# Patient Record
Sex: MALE | Race: WHITE | URBAN - METROPOLITAN AREA
[De-identification: names, ages, dates, MRNs, and addresses within clinical notes are randomized per-mention and may not be internally consistent; named-entity substitution may affect disease eponyms.]

---

## 2017-10-25 ENCOUNTER — TELEPHONE (OUTPATIENT)
Dept: ENT CLINIC | Age: 67
End: 2017-10-25

## 2017-11-07 ENCOUNTER — OFFICE VISIT (OUTPATIENT)
Dept: VASCULAR SURGERY | Age: 67
End: 2017-11-07

## 2017-11-07 VITALS
DIASTOLIC BLOOD PRESSURE: 40 MMHG | BODY MASS INDEX: 28.2 KG/M2 | HEIGHT: 70 IN | SYSTOLIC BLOOD PRESSURE: 92 MMHG | OXYGEN SATURATION: 96 % | WEIGHT: 197 LBS | HEART RATE: 81 BPM

## 2017-11-07 DIAGNOSIS — I70.223 ATHEROSCLEROSIS OF NATIVE ARTERY OF BOTH LOWER EXTREMITIES WITH REST PAIN (HCC): ICD-10-CM

## 2017-11-07 DIAGNOSIS — I73.9 PAD (PERIPHERAL ARTERY DISEASE) (HCC): Primary | ICD-10-CM

## 2017-11-07 PROCEDURE — 99215 OFFICE O/P EST HI 40 MIN: CPT | Performed by: SURGERY

## 2017-11-07 ASSESSMENT — ENCOUNTER SYMPTOMS
WHEEZING: 0
NAUSEA: 0
DIARRHEA: 0
HEARTBURN: 1
VOMITING: 0
EYES NEGATIVE: 1
ABDOMINAL PAIN: 0
ORTHOPNEA: 0
CONSTIPATION: 0
BLOOD IN STOOL: 0
SPUTUM PRODUCTION: 0

## 2017-11-07 NOTE — PATIENT INSTRUCTIONS
Patient Education        Peripheral Arterial Disease of the Leg: Care Instructions  Your Care Instructions  Peripheral arterial disease (PAD) occurs when the blood vessels (arteries) that supply blood to the legs, belly, pelvis, arms, or neck get too narrow. This reduces blood flow to that area. The legs are affected most often. Fatty buildup (plaque) in the arteries usually is the cause of PAD. This buildup is also called \"hardening\" of the arteries. Your risk of PAD increases if you smoke or have high cholesterol, high blood pressure, diabetes, or a family history of PAD. One of the main symptoms of PAD is intermittent claudication. This is a tight, aching, or squeezing pain in the calf, foot, thigh, or buttock that occurs during exercise. The pain usually gets worse during exercise and goes away when you rest. But as PAD gets worse, you may feel pain even at rest.  Medicines and lifestyle changes may help your symptoms and lower your risk of heart attack and stroke. In some cases, surgery or other treatment is needed. It is important that you follow up with your doctor. Follow-up care is a key part of your treatment and safety. Be sure to make and go to all appointments, and call your doctor if you are having problems. It's also a good idea to know your test results and keep a list of the medicines you take. How can you care for yourself at home? · Do not smoke. Smoking can make PAD worse. If you need help quitting, talk to your doctor about stop-smoking programs and medicines. These can increase your chances of quitting for good. · Take your medicines exactly as prescribed. Call your doctor if you think you are having a problem with your medicine. · If you take a blood thinner, such as aspirin, be sure to get instructions about how to take your medicine safely. Blood thinners can cause serious bleeding problems. · Ask your doctor if a cardiac rehab program is right for you.  Cardiac rehab can help you

## 2017-11-07 NOTE — PROGRESS NOTES
folic acid (FOLVITE) 1 MG tablet Take 1 tablet by mouth daily 10/24/17   Verena Alexander MD   carvedilol (COREG) 6.25 MG tablet Take 1 tablet by mouth 2 times daily (with meals) 10/24/17   Verena Alexander MD   mupirocin (BACTROBAN) 2 % ointment Apply topically daily Apply topically 3 times daily. 10/24/17   Verena Alexander MD   pantoprazole (PROTONIX) 40 MG tablet Take 1 tablet by mouth every morning (before breakfast) 10/24/17   Verena Alexander MD   thiamine 100 MG tablet Take 1 tablet by mouth daily 10/24/17   Verena Alexander MD   spironolactone (ALDACTONE) 50 MG tablet Take 1 tablet by mouth daily 10/25/17   Verena Alexander MD   diclofenac sodium 1 % GEL Apply 2 g topically 2 times daily 10/24/17   Verena Alexander MD   lidocaine (LIDODERM) 5 % Place 1 patch onto the skin every 12 hours 12 hours on, 12 hours off. 10/24/17   Verena Alexander MD   furosemide (LASIX) 20 MG tablet Take 1 tablet by mouth daily 10/24/17   Verena Alexander MD        Allergies:  Pcn [penicillins]     Social History:    Social History     Social History    Marital status: Single     Spouse name: N/A    Number of children: N/A    Years of education: N/A     Occupational History    Not on file. Social History Main Topics    Smoking status: Former Smoker    Smokeless tobacco: Never Used    Alcohol use 7.2 oz/week     12 Cans of beer per week      Comment: daily     Drug use: No    Sexual activity: Not on file     Other Topics Concern    Not on file     Social History Narrative    No narrative on file       Family History:  History reviewed. No pertinent family history. Review of Systems:  Review of Systems   Constitutional: Negative. HENT: Negative. Eyes: Negative. Respiratory: Negative for sputum production and wheezing. Shortness of breath: mild, reports h/o COPD. Denies use of supplemental oxygen at home. Cardiovascular: Positive for claudication.  Negative for chest pain, palpitations, orthopnea, leg swelling and PND. Gastrointestinal: Positive for heartburn. Negative for abdominal pain, blood in stool, constipation, diarrhea, melena, nausea and vomiting. Recent embolization for GI bleed   Genitourinary: Negative. Musculoskeletal: Negative. Skin: Positive for rash (active psoriasis). Negative for itching. Neurological: Negative. Endo/Heme/Allergies: Negative. Psychiatric/Behavioral: Negative. Physical Examination:    Vitals:    11/07/17 1038   BP: (!) 92/40   Site: Right Arm   Position: Sitting   Cuff Size: Medium Adult   Pulse: 81   SpO2: 96%   Weight: 197 lb (89.4 kg)   Height: 5' 10\" (1.778 m)     Body mass index is 28.27 kg/m². Physical Exam   Constitutional: He is oriented to person, place, and time. He appears well-developed and well-nourished. He is active. Non-toxic appearance. He does not appear ill. No distress. HENT:   Head: Normocephalic and atraumatic. Eyes: Conjunctivae and EOM are normal. Pupils are equal, round, and reactive to light. No scleral icterus. Neck: Normal range of motion. Neck supple. Normal carotid pulses and no JVD present. Carotid bruit is not present. No thyroid mass and no thyromegaly present. Cardiovascular: Normal rate, regular rhythm and normal heart sounds. Exam reveals no gallop and no friction rub. No murmur heard. Pulmonary/Chest: Effort normal and breath sounds normal. No accessory muscle usage. No respiratory distress. He has no decreased breath sounds. He has no wheezes. He has no rales. Abdominal: Soft. Bowel sounds are normal. He exhibits no distension, no fluid wave, no abdominal bruit and no mass. There is no tenderness. There is no rebound. Musculoskeletal: Normal range of motion. He exhibits no edema. Lymphadenopathy:     He has no cervical adenopathy. Neurological: He is alert and oriented to person, place, and time. He has normal strength. No cranial nerve deficit or sensory deficit.    Skin: Skin is warm, dry and intact. Rash (positive psoriasis patches on left knee.) noted. Psychiatric: He has a normal mood and affect. His behavior is normal. Judgment and thought content normal.     Extremities:    Vascular exam:   Pulses:    carotid brachial radial femoral popliteal DP PT   RIGHT 2 2  1  0 mono   LEFT 2 2  2  1 0       MEDICAL DECISION MAKING/TESTING  JOSH:  RIGHT 0.28;  LEFT  0.75  Non-invasive vascular studies:  VL Lower Extremity Arteries Right 10/17/2017:  Summary        Severe arterial insufficiency of the right lower extremity with evidence of    inflow disease. Impression:  Severe RLE PAD with rest pain    Plan:  Mr. Jorge Luis Nicole will likely need intervention for his severe right leg PAD. He is having rest pain but no tissue loss. He is walking only a little at rehab. We will check a CTA Aorta with runoff to assess for surgical options. I will see him back in 1-2 weeks with the results of these studies.

## 2017-11-09 ENCOUNTER — HOSPITAL ENCOUNTER (OUTPATIENT)
Dept: CT IMAGING | Age: 67
Discharge: HOME | End: 2017-11-09
Attending: SURGERY | Admitting: SURGERY

## 2017-11-09 DIAGNOSIS — I70.223 ATHEROSCLEROSIS OF NATIVE ARTERY OF BOTH LOWER EXTREMITIES WITH REST PAIN (HCC): ICD-10-CM

## 2017-11-09 DIAGNOSIS — I73.9 PAD (PERIPHERAL ARTERY DISEASE) (HCC): ICD-10-CM

## 2017-11-16 ENCOUNTER — TELEPHONE (OUTPATIENT)
Dept: VASCULAR SURGERY | Age: 67
End: 2017-11-16

## 2017-11-16 ENCOUNTER — OFFICE VISIT (OUTPATIENT)
Dept: VASCULAR SURGERY | Age: 67
End: 2017-11-16

## 2017-11-16 ENCOUNTER — TELEPHONE (OUTPATIENT)
Dept: SURGERY | Age: 67
End: 2017-11-16

## 2017-11-16 VITALS
OXYGEN SATURATION: 99 % | HEART RATE: 95 BPM | HEIGHT: 70 IN | WEIGHT: 198.2 LBS | BODY MASS INDEX: 28.37 KG/M2 | SYSTOLIC BLOOD PRESSURE: 119 MMHG | DIASTOLIC BLOOD PRESSURE: 72 MMHG

## 2017-11-16 DIAGNOSIS — I73.9 PAD (PERIPHERAL ARTERY DISEASE) (HCC): Primary | ICD-10-CM

## 2017-11-16 PROCEDURE — 99214 OFFICE O/P EST MOD 30 MIN: CPT | Performed by: SURGERY

## 2017-11-16 NOTE — PROGRESS NOTES
[address] [place] [address] [facility]  Tiffanie Mancilla, [address] [facility] [address]    Follow-up    Referring Provider:  No primary care provider on file. Patient Name: Lebron Khan  YOB: 1950  Date: 11/16/17    History:  Lebron Khan is a 74 y.o. male being followed for Right leg rest pain. The patient is doing well but complains of continued pain in his right foot at night which resolves with hanging the foot over the side of the bed. He currently does resides at [facility], but he is going to go home this Saturday. He is improving with physical therapy and is able to walk down the hallway. He denies any claudication, but again, is not walking great distances. The patient has had CT angiogram of the lower extremities which reveals a left common iliac stents. There appears to be some impingement on the orifice of the right common iliac artery, which was not previously buttressed with a kissing stent. He also has significant disease throughout the right common and external iliac artery portion as well as right SFA disease. He has three-vessel runoff to the foot with a high takeoff of the anterior tibial artery. In summary, he was recently admitted for massive GI bleeding which stemmed from his taking 5 and 6 naproxen at a time to treat his right foot rest pain. He has had no further symptoms of GI bleeding. Vital Signs  /72 (Site: Right Arm, Position: Sitting, Cuff Size: Medium Adult)   Pulse 95   Ht 5' 10\" (1.778 m)   Wt 198 lb 3.2 oz (89.9 kg)   SpO2 99%   BMI 28.44 kg/m²     Physical Examination:  General:  alert and oriented to person, place and time, well-developed and well-nourished, in no acute distress  Heart: Normal S1 and S2.  Regular rhythm. No murmurs, gallops, or rubs. Lungs:  clear to auscultation without wheezes or rales  Abdomen: soft, nontender, no pulsatile mass palpable. Extremities (musculoskeletal and neurologic):  No cyanosis or clubbing. There is 1+ non-pitting edema of the right foot, likely from keeping it in dependent position. Motor exam is symmetrical 5 out of 5 all extremities bilaterally. Skin/integumentary: Skin color, texture, turgor normal. No rashes or lesions. Vascular exam:   Pulses:    carotid brachial radial femoral popliteal DP PT   RIGHT    1  Faint, mon mono   LEFT    2  1 0     ABIS:  Right 0.28; Left 0.75    Impression:  1. Severe RLE PAD with rest pain. 2.  Recent GI bleed related to taking Naproxyn for rest pain. Plan:  Mr. Shahrzad Campoverde appears to be doing much better today. He is still having some rest pain in the right foot, though it does not seem to be severely affecting his sleep. It does wake him up periodically during the night and he dangles his foot. He currently denies claudication, but is not walking much at rehab. He is going home this weekend. Given that his JOSH is 0.28 and there is a component of rest pain, and particularly in light of his recent severe GI bleed which required gastroduodenal artery embolization and embolization of gastric varices which may have been exacerbated by medications for rest pain, I would recommend intervention. He appears to have 2 main levels of disease, namely the right iliac system as well as the right superficial femoral artery. I think that his rest pain will be resolved simply by stenting the right common and external iliac arteries. He has significant calcified disease of the superficial femoral artery, which would best be served with a bypass. However, again, if his symptoms resolve with the iliac intervention, I doubt that we will need to proceed with the femoral intervention. He is agreeable to this plan and will be scheduled for a right iliac angioplasty and stent in early December.

## 2017-11-16 NOTE — TELEPHONE ENCOUNTER
GALO for pt to CB regarding his surgery date:     [facility] 12/8/2017 CATH LAB  Arrive at 0630, NPO after midnight  Aortoiliac arteriogram with right iliac angioplasty and stent

## 2017-11-16 NOTE — PATIENT INSTRUCTIONS
deodorants, or nail polish. · Take off all jewelry and piercings. And take out contact lenses, if you wear them. At the hospital or surgery center  · Bring a picture ID. · You will be kept comfortable and safe by your anesthesia provider. You may get medicine that relaxes you or puts you in a light sleep. The area being worked on will be numb. · The procedure will take 1½ to 3 hours. · After the procedure, pressure will be applied to the area where the catheter was put in your blood vessel. Then the area may be covered with a bandage or a compression device. This will prevent bleeding. · Nurses will check your heart rate and blood pressure. The nurse will also check the catheter site for bleeding. · You will need to lie still and keep your leg straight for several hours. The nurse may put a weighted bag on your leg to keep it still. · You may have a bruise or a small lump where the catheter was put in your blood vessel. This is normal and will go away. Going home  · Be sure you have someone to drive you home. Anesthesia and pain medicine make it unsafe for you to drive. · You will be given more specific instructions about recovering from your procedure. They will cover things like diet, wound care, follow-up care, driving, and getting back to your normal routine. When should you call your doctor? · You have questions or concerns. · You don't understand how to prepare for your procedure. · You become ill before the procedure (such as fever, flu, or a cold). · You need to reschedule or have changed your mind about having the procedure. Where can you learn more? Go to [email or web address] and sign in to your [employer] account. Enter [phone] in the [facility] box to learn more about \"Peripheral Artery Angioplasty: Before Your Procedure. \"     If you do not have an account, please click on the \"Sign Up Now\" link.   Current as of: March 20, 2017  Content Version: 11.3  © 6377-3806 Healthwise, Incorporated. Care instructions adapted under license by [facility] ([facility]). If you have questions about a medical condition or this instruction, always ask your healthcare professional. [address] any warranty or liability for your use of this information. Patient Education        Peripheral Artery Angioplasty: What to Expect at Home  Your Recovery  Your groin or leg may have a bruise or a small lump where the catheter was put in your groin. The area may feel sore for a day or two after the procedure. You can do light activities around the house but nothing strenuous for several days. After surgery, blood may flow better throughout your leg, which can decrease leg pain, numbness, and cramping. This care sheet gives you a general idea about how long it will take for you to recover. But each person recovers at a different pace. Follow the steps below to feel better as quickly as possible. How can you care for yourself at home? Activity  · Do not do strenuous exercise and do not lift anything heavy until your doctor says it is okay. This may be for a day or two. You can walk around the house and do light activity, such as cooking. · You may shower 24 to 48 hours after the procedure, if your doctor okays it. Pat the incision dry. Do not take a bath for 1 week, or until your doctor tells you it is okay. · Go back to regular exercise when your doctor says it is okay. Walking is a good choice. · If you work, you will probably need to take 1 or 2 days off. It depends on the type of work you do and how you feel. Diet  · You can eat your normal diet. · Drink plenty of fluids (unless your doctor tells you not to). Medicines  · Your doctor will tell you if and when you can restart your medicines. He or she will also give you instructions about taking any new medicines.   · If you take blood thinners, such as warfarin (Coumadin), clopidogrel (Plavix), or aspirin, be sure to

## 2017-11-27 ENCOUNTER — OFFICE VISIT (OUTPATIENT)
Dept: INTERNAL MEDICINE CLINIC | Age: 67
End: 2017-11-27

## 2017-11-27 VITALS
WEIGHT: 199.4 LBS | HEIGHT: 70 IN | HEART RATE: 83 BPM | TEMPERATURE: 97.3 F | OXYGEN SATURATION: 99 % | DIASTOLIC BLOOD PRESSURE: 78 MMHG | BODY MASS INDEX: 28.55 KG/M2 | SYSTOLIC BLOOD PRESSURE: 120 MMHG

## 2017-11-27 DIAGNOSIS — I10 ESSENTIAL HYPERTENSION: ICD-10-CM

## 2017-11-27 DIAGNOSIS — Z01.818 PRE-OP EXAM: Primary | ICD-10-CM

## 2017-11-27 DIAGNOSIS — Z12.11 COLON CANCER SCREENING: ICD-10-CM

## 2017-11-27 DIAGNOSIS — R94.31 ABNORMAL EKG: ICD-10-CM

## 2017-11-27 DIAGNOSIS — Z13.220 SCREENING CHOLESTEROL LEVEL: ICD-10-CM

## 2017-11-27 DIAGNOSIS — J44.9 COPD, MILD (HCC): ICD-10-CM

## 2017-11-27 LAB
ANION GAP SERPL CALCULATED.3IONS-SCNC: 17 MMOL/L (ref 3–16)
BASOPHILS ABSOLUTE: 0.1 K/UL (ref 0–0.2)
BASOPHILS RELATIVE PERCENT: 1.4 %
BUN BLDV-MCNC: 8 MG/DL (ref 7–20)
CALCIUM SERPL-MCNC: 9.9 MG/DL (ref 8.3–10.6)
CHLORIDE BLD-SCNC: 97 MMOL/L (ref 99–110)
CHOLESTEROL, TOTAL: 186 MG/DL (ref 0–199)
CO2: 24 MMOL/L (ref 21–32)
CREAT SERPL-MCNC: 0.7 MG/DL (ref 0.8–1.3)
EOSINOPHILS ABSOLUTE: 0.3 K/UL (ref 0–0.6)
EOSINOPHILS RELATIVE PERCENT: 3.5 %
GFR AFRICAN AMERICAN: >60
GFR NON-AFRICAN AMERICAN: >60
GLUCOSE BLD-MCNC: 95 MG/DL (ref 70–99)
HCT VFR BLD CALC: 37.7 % (ref 40.5–52.5)
HDLC SERPL-MCNC: 45 MG/DL (ref 40–60)
HEMOGLOBIN: 11.6 G/DL (ref 13.5–17.5)
LDL CHOLESTEROL CALCULATED: 118 MG/DL
LYMPHOCYTES ABSOLUTE: 2.6 K/UL (ref 1–5.1)
LYMPHOCYTES RELATIVE PERCENT: 28.3 %
MCH RBC QN AUTO: 26.5 PG (ref 26–34)
MCHC RBC AUTO-ENTMCNC: 30.6 G/DL (ref 31–36)
MCV RBC AUTO: 86.6 FL (ref 80–100)
MONOCYTES ABSOLUTE: 1.1 K/UL (ref 0–1.3)
MONOCYTES RELATIVE PERCENT: 11.7 %
NEUTROPHILS ABSOLUTE: 5.1 K/UL (ref 1.7–7.7)
NEUTROPHILS RELATIVE PERCENT: 55.1 %
PDW BLD-RTO: 19.4 % (ref 12.4–15.4)
PLATELET # BLD: 205 K/UL (ref 135–450)
PMV BLD AUTO: 8.7 FL (ref 5–10.5)
POTASSIUM SERPL-SCNC: 4.3 MMOL/L (ref 3.5–5.1)
RBC # BLD: 4.36 M/UL (ref 4.2–5.9)
SODIUM BLD-SCNC: 138 MMOL/L (ref 136–145)
TRIGL SERPL-MCNC: 114 MG/DL (ref 0–150)
VLDLC SERPL CALC-MCNC: 23 MG/DL
WBC # BLD: 9.2 K/UL (ref 4–11)

## 2017-11-27 PROCEDURE — 1123F ACP DISCUSS/DSCN MKR DOCD: CPT | Performed by: NURSE PRACTITIONER

## 2017-11-27 PROCEDURE — G8599 NO ASA/ANTIPLAT THER USE RNG: HCPCS | Performed by: NURSE PRACTITIONER

## 2017-11-27 PROCEDURE — G8484 FLU IMMUNIZE NO ADMIN: HCPCS | Performed by: NURSE PRACTITIONER

## 2017-11-27 PROCEDURE — 1036F TOBACCO NON-USER: CPT | Performed by: NURSE PRACTITIONER

## 2017-11-27 PROCEDURE — 4040F PNEUMOC VAC/ADMIN/RCVD: CPT | Performed by: NURSE PRACTITIONER

## 2017-11-27 PROCEDURE — 93000 ELECTROCARDIOGRAM COMPLETE: CPT | Performed by: NURSE PRACTITIONER

## 2017-11-27 PROCEDURE — 3023F SPIROM DOC REV: CPT | Performed by: NURSE PRACTITIONER

## 2017-11-27 PROCEDURE — 99203 OFFICE O/P NEW LOW 30 MIN: CPT | Performed by: NURSE PRACTITIONER

## 2017-11-27 PROCEDURE — G8427 DOCREV CUR MEDS BY ELIG CLIN: HCPCS | Performed by: NURSE PRACTITIONER

## 2017-11-27 PROCEDURE — G8926 SPIRO NO PERF OR DOC: HCPCS | Performed by: NURSE PRACTITIONER

## 2017-11-27 PROCEDURE — G8419 CALC BMI OUT NRM PARAM NOF/U: HCPCS | Performed by: NURSE PRACTITIONER

## 2017-11-27 PROCEDURE — 3017F COLORECTAL CA SCREEN DOC REV: CPT | Performed by: NURSE PRACTITIONER

## 2017-11-27 RX ORDER — ALBUTEROL SULFATE 90 UG/1
2 AEROSOL, METERED RESPIRATORY (INHALATION) EVERY 6 HOURS PRN
Qty: 1 INHALER | Refills: 3 | Status: SHIPPED | OUTPATIENT
Start: 2017-11-27 | End: 2020-11-11 | Stop reason: SDUPTHER

## 2017-11-27 ASSESSMENT — PATIENT HEALTH QUESTIONNAIRE - PHQ9
2. FEELING DOWN, DEPRESSED OR HOPELESS: 0
1. LITTLE INTEREST OR PLEASURE IN DOING THINGS: 0
SUM OF ALL RESPONSES TO PHQ9 QUESTIONS 1 & 2: 0
SUM OF ALL RESPONSES TO PHQ QUESTIONS 1-9: 0

## 2017-11-27 NOTE — PROGRESS NOTES
Preoperative Consultation      Elan Knox  YOB: 1950    Date of Service:  11/27/2017    Vitals:    11/27/17 0954   BP: 120/78   Site: Right Arm   Position: Sitting   Pulse: 83   Temp: 97.3 °F (36.3 °C)   TempSrc: Oral   SpO2: 99%   Weight: 199 lb 6.4 oz (90.4 kg)   Height: 5' 10\" (1.778 m)      Wt Readings from Last 2 Encounters:   11/27/17 199 lb 6.4 oz (90.4 kg)   11/16/17 198 lb 3.2 oz (89.9 kg)     BP Readings from Last 3 Encounters:   11/27/17 120/78   11/16/17 119/72   11/07/17 (!) 92/40        Chief Complaint   Patient presents with   Shamarbeth Osborne New Patient    Pre-op Exam     Surgery [facility] 12.8.17 at [facility]      Allergies   Allergen Reactions    Pcn [Penicillins] Hives     Outpatient Prescriptions Marked as Taking for the 11/27/17 encounter (Office Visit) with Maci Chase NP   Medication Sig Dispense Refill    albuterol sulfate HFA (PROAIR HFA) 108 (90 Base) MCG/ACT inhaler Inhale 2 puffs into the lungs every 6 hours as needed for Wheezing 1 Inhaler 3    umeclidinium-vilanterol (ANORO ELLIPTA) 62.5-25 MCG/INH AEPB inhaler Inhale 1 puff into the lungs daily 60 each 0    folic acid (FOLVITE) 1 MG tablet Take 1 tablet by mouth daily 30 tablet 3    carvedilol (COREG) 6.25 MG tablet Take 1 tablet by mouth 2 times daily (with meals) 60 tablet 3    mupirocin (BACTROBAN) 2 % ointment Apply topically daily Apply topically 3 times daily. 1 Tube 2    pantoprazole (PROTONIX) 40 MG tablet Take 1 tablet by mouth every morning (before breakfast) 30 tablet 3    thiamine 100 MG tablet Take 1 tablet by mouth daily 30 tablet 3    spironolactone (ALDACTONE) 50 MG tablet Take 1 tablet by mouth daily 30 tablet 3    diclofenac sodium 1 % GEL Apply 2 g topically 2 times daily 1 Tube 3    lidocaine (LIDODERM) 5 % Place 1 patch onto the skin every 12 hours 12 hours on, 12 hours off.  30 patch 0    furosemide (LASIX) 20 MG tablet Take 1 tablet by mouth daily 30 tablet 3       This patient presents to the office today for a preoperative consultation at the request of surgeon, Dr. Candace Russell, who plans on performing aortoiliac arteriogram with right iliac angioplasty and stent on December 8 at [address] [place] [address].     Planned anesthesia: General   Known anesthesia problems: None   Bleeding risk: Remote history of abnormal bleeding- GI bleed  Personal or FH of DVT/PE: No    Patient objection to receiving blood products: No    Patient Active Problem List   Diagnosis    Bleeding gastric varices    [facility]per[facility] disease) [facility]    COPD, mild ([facility]    Essential hypertension       Past Medical History:   Diagnosis Date    COPD (chronic obstructive pulmonary disease) ([facility]     Hyperlipidemia     Hypertension      Past Surgical History:   Procedure Laterality Date    OTHER SURGICAL HISTORY  10/16/2017    Dr. Jonathon KELLER for gastric varices    UPPER GASTROINTESTINAL ENDOSCOPY  10/17/2017    Dr. Senaida Kussmaul - moderate-large varix in cardia/fundus w/tiny nipple, no bleeding    UPPER GASTROINTESTINAL ENDOSCOPY  10/16/2017    Dr. Senaida Kussmaul - few tiny antral erosions, biopsies done for H pylori, large varices in cardia     Family History   Problem Relation Age of Onset    High Blood Pressure Father      Social History     Social History    Marital status: Single     Spouse name: N/A    Number of children: N/A    Years of education: N/A     Occupational History    Not on file. Social History Main Topics    Smoking status: Former Smoker     Types: Cigarettes    Smokeless tobacco: Never Used    Alcohol use 7.2 oz/week     12 Cans of beer per week      Comment: daily     Drug use: No    Sexual activity: Not on file     Other Topics Concern    Not on file     Social History Narrative    No narrative on file       Review of Systems  A comprehensive review of systems was negative except for what was noted in the HPI.      Physical Exam   Constitutional: He is oriented to person, place,

## 2017-11-28 ENCOUNTER — HOSPITAL ENCOUNTER (OUTPATIENT)
Dept: NON INVASIVE DIAGNOSTICS | Age: 67
Discharge: HOME | End: 2017-11-28
Attending: INTERNAL MEDICINE | Admitting: INTERNAL MEDICINE

## 2017-11-28 ENCOUNTER — HOSPITAL ENCOUNTER (OUTPATIENT)
Dept: VASCULAR LAB | Age: 67
Discharge: HOME | End: 2017-11-28
Attending: SURGERY | Admitting: SURGERY

## 2017-11-28 DIAGNOSIS — I73.9 PERIPHERAL VASCULAR DISEASE (HCC): ICD-10-CM

## 2017-11-28 DIAGNOSIS — R94.31 ABNORMAL ELECTROCARDIOGRAM: ICD-10-CM

## 2017-11-28 DIAGNOSIS — R94.31 ABNORMAL EKG: ICD-10-CM

## 2017-11-28 DIAGNOSIS — I77.1 STENOSIS OF ILIAC ARTERY (HCC): Primary | ICD-10-CM

## 2017-11-28 LAB
LV EF: 46 %
LVEF MODALITY: NORMAL

## 2017-11-29 ENCOUNTER — TELEPHONE (OUTPATIENT)
Dept: INTERNAL MEDICINE CLINIC | Age: 67
End: 2017-11-29

## 2017-11-29 NOTE — TELEPHONE ENCOUNTER
Mary Ellen Cervantes from Dr Alexis Murray office called. Patient is having an aorto-illiac arteriogram on 12.8. 17. Upon reading the pre-op papers she saw that the patient told Ms Evelyn Perrin that he was having general anesthesia and there was a concern over the abnormal EKG, .  Mary Ellen Cervantes wants Ms tadeo to know that patient is having local anesthesia. I informed Ms Evelyn Perrin and she stated she already cleared the patient for the surgery due to normal stress test.    I informed Mary Ellen Cervantes that Hermancharles Kimbrough is cleared for surgery. She can pull anything she needs out of [facility].

## 2017-12-07 ENCOUNTER — TELEPHONE (OUTPATIENT)
Dept: SURGERY | Age: 67
End: 2017-12-07

## 2017-12-07 NOTE — TELEPHONE ENCOUNTER
Ari Latham w/ the cath lab [phone] states patient is scheduled tomorrow 12/8 @ 7:30. Ari Latham states patient is scheduled for general anesthesia and is questioning if that is correct. Per Dr Don Gibson patient should NOT be scheduled for general anesthesia, patient should be scheduled for local anesthesia.

## 2017-12-08 ENCOUNTER — HOSPITAL ENCOUNTER (OUTPATIENT)
Dept: CARDIAC CATH/INVASIVE PROCEDURES | Age: 67
Discharge: HOME | End: 2017-12-08
Attending: SURGERY | Admitting: SURGERY

## 2017-12-08 VITALS — BODY MASS INDEX: 28.2 KG/M2 | HEIGHT: 70 IN | WEIGHT: 197 LBS | TEMPERATURE: 97.8 F

## 2017-12-08 DIAGNOSIS — I73.9 PAD (PERIPHERAL ARTERY DISEASE) (HCC): ICD-10-CM

## 2017-12-08 LAB
INR BLD: 1.13 (ref 0.85–1.15)
PROTHROMBIN TIME: 12.8 SEC (ref 9.6–13)

## 2017-12-08 PROCEDURE — 37222 PR REVASCULARIZATION ILIAC ART ANGIOP EA IPSI VSL: CPT | Performed by: SURGERY

## 2017-12-08 PROCEDURE — 37221 PR REVSC OPN/PRQ ILIAC ART W/STNT PLMT & ANGIOPLSTY: CPT | Performed by: SURGERY

## 2017-12-08 PROCEDURE — 75625 CONTRAST EXAM ABDOMINL AORTA: CPT | Performed by: SURGERY

## 2017-12-08 RX ORDER — ONDANSETRON 2 MG/ML
4 INJECTION INTRAMUSCULAR; INTRAVENOUS EVERY 6 HOURS PRN
Status: DISCONTINUED | OUTPATIENT
Start: 2017-12-08 | End: 2017-12-09 | Stop reason: HOSPADM

## 2017-12-08 RX ORDER — SODIUM CHLORIDE 0.9 % (FLUSH) 0.9 %
10 SYRINGE (ML) INJECTION PRN
Status: DISCONTINUED | OUTPATIENT
Start: 2017-12-08 | End: 2017-12-09 | Stop reason: HOSPADM

## 2017-12-08 RX ORDER — ACETAMINOPHEN 325 MG/1
650 TABLET ORAL EVERY 4 HOURS PRN
Status: DISCONTINUED | OUTPATIENT
Start: 2017-12-08 | End: 2017-12-09 | Stop reason: HOSPADM

## 2017-12-08 RX ORDER — ASPIRIN 81 MG/1
81 TABLET ORAL DAILY
Qty: 30 TABLET | Refills: 3 | Status: SHIPPED | OUTPATIENT
Start: 2017-12-08 | End: 2018-01-05 | Stop reason: SDUPTHER

## 2017-12-08 RX ORDER — SODIUM CHLORIDE 0.9 % (FLUSH) 0.9 %
10 SYRINGE (ML) INJECTION EVERY 12 HOURS SCHEDULED
Status: DISCONTINUED | OUTPATIENT
Start: 2017-12-08 | End: 2017-12-09 | Stop reason: HOSPADM

## 2017-12-14 ENCOUNTER — TELEPHONE (OUTPATIENT)
Dept: ENT CLINIC | Age: 67
End: 2017-12-14

## 2017-12-14 DIAGNOSIS — I73.9 PAD (PERIPHERAL ARTERY DISEASE) (HCC): Primary | ICD-10-CM

## 2017-12-14 NOTE — TELEPHONE ENCOUNTER
Pt called back stating his leg pain is doing much better,  He does have some swelling in his foot. The patient will call [facility] and schedule his Seg Pressure study for next week and call me back to set up a follow up appt with Dr. Elena Lozada.

## 2017-12-14 NOTE — TELEPHONE ENCOUNTER
----- Message from Bethany Rojas MD sent at 12/14/2017  2:32 PM EST -----  Hi Rudolph Clifton,    Can you call Mr. Penelope Rose and see how he is doing after his angio, if symptoms are better? Also, can you make sure he has repeat multi-level segmental pressures prior to his next visit? Thanks!

## 2017-12-19 ENCOUNTER — HOSPITAL ENCOUNTER (OUTPATIENT)
Dept: VASCULAR LAB | Age: 67
Discharge: HOME | End: 2017-12-19
Attending: SURGERY | Admitting: SURGERY

## 2017-12-19 ENCOUNTER — TELEPHONE (OUTPATIENT)
Dept: SURGERY | Age: 67
End: 2017-12-19

## 2017-12-19 ENCOUNTER — OFFICE VISIT (OUTPATIENT)
Dept: VASCULAR SURGERY | Age: 67
End: 2017-12-19

## 2017-12-19 VITALS
DIASTOLIC BLOOD PRESSURE: 89 MMHG | SYSTOLIC BLOOD PRESSURE: 159 MMHG | OXYGEN SATURATION: 96 % | HEIGHT: 70 IN | WEIGHT: 196 LBS | BODY MASS INDEX: 28.06 KG/M2 | HEART RATE: 77 BPM

## 2017-12-19 DIAGNOSIS — I82.4Y1 DVT, LOWER EXTREMITY, PROXIMAL, ACUTE, RIGHT (HCC): Primary | ICD-10-CM

## 2017-12-19 DIAGNOSIS — M79.89 RIGHT LEG SWELLING: ICD-10-CM

## 2017-12-19 DIAGNOSIS — I73.9 PAD (PERIPHERAL ARTERY DISEASE) (HCC): Primary | ICD-10-CM

## 2017-12-19 DIAGNOSIS — I73.9 PERIPHERAL VASCULAR DISEASE (HCC): ICD-10-CM

## 2017-12-19 PROCEDURE — 99213 OFFICE O/P EST LOW 20 MIN: CPT | Performed by: SURGERY

## 2017-12-19 NOTE — PROGRESS NOTES
This may simply be related to the same episode. He has IVC filter in place. However, since his bleeding episodes have resolved, I am going to treat him with Xarelto for 3 months. I have also given him a prescription for stockings.

## 2018-01-05 ENCOUNTER — OFFICE VISIT (OUTPATIENT)
Dept: INTERNAL MEDICINE CLINIC | Age: 68
End: 2018-01-05

## 2018-01-05 VITALS
SYSTOLIC BLOOD PRESSURE: 132 MMHG | BODY MASS INDEX: 28.35 KG/M2 | WEIGHT: 197.6 LBS | DIASTOLIC BLOOD PRESSURE: 78 MMHG | HEART RATE: 82 BPM | OXYGEN SATURATION: 98 %

## 2018-01-05 DIAGNOSIS — I73.9 PAD (PERIPHERAL ARTERY DISEASE) (HCC): Primary | ICD-10-CM

## 2018-01-05 DIAGNOSIS — M25.561 CHRONIC PAIN OF RIGHT KNEE: ICD-10-CM

## 2018-01-05 DIAGNOSIS — I10 ESSENTIAL HYPERTENSION: ICD-10-CM

## 2018-01-05 DIAGNOSIS — Z23 NEED FOR VACCINATION WITH 13-POLYVALENT PNEUMOCOCCAL CONJUGATE VACCINE: ICD-10-CM

## 2018-01-05 DIAGNOSIS — G89.29 CHRONIC PAIN OF RIGHT KNEE: ICD-10-CM

## 2018-01-05 PROCEDURE — 1123F ACP DISCUSS/DSCN MKR DOCD: CPT | Performed by: NURSE PRACTITIONER

## 2018-01-05 PROCEDURE — G8427 DOCREV CUR MEDS BY ELIG CLIN: HCPCS | Performed by: NURSE PRACTITIONER

## 2018-01-05 PROCEDURE — G8419 CALC BMI OUT NRM PARAM NOF/U: HCPCS | Performed by: NURSE PRACTITIONER

## 2018-01-05 PROCEDURE — 90670 PCV13 VACCINE IM: CPT | Performed by: NURSE PRACTITIONER

## 2018-01-05 PROCEDURE — 1036F TOBACCO NON-USER: CPT | Performed by: NURSE PRACTITIONER

## 2018-01-05 PROCEDURE — G0009 ADMIN PNEUMOCOCCAL VACCINE: HCPCS | Performed by: NURSE PRACTITIONER

## 2018-01-05 PROCEDURE — 99213 OFFICE O/P EST LOW 20 MIN: CPT | Performed by: NURSE PRACTITIONER

## 2018-01-05 PROCEDURE — 4040F PNEUMOC VAC/ADMIN/RCVD: CPT | Performed by: NURSE PRACTITIONER

## 2018-01-05 PROCEDURE — G8484 FLU IMMUNIZE NO ADMIN: HCPCS | Performed by: NURSE PRACTITIONER

## 2018-01-05 PROCEDURE — 3017F COLORECTAL CA SCREEN DOC REV: CPT | Performed by: NURSE PRACTITIONER

## 2018-01-05 RX ORDER — FUROSEMIDE 20 MG/1
20 TABLET ORAL DAILY
Qty: 30 TABLET | Refills: 3 | Status: SHIPPED | OUTPATIENT
Start: 2018-01-05 | End: 2018-04-24 | Stop reason: SDUPTHER

## 2018-01-05 RX ORDER — ASPIRIN 81 MG/1
81 TABLET ORAL DAILY
Qty: 30 TABLET | Refills: 3 | Status: SHIPPED | OUTPATIENT
Start: 2018-01-05 | End: 2019-11-19 | Stop reason: ALTCHOICE

## 2018-01-05 RX ORDER — BLOOD PRESSURE TEST KIT
KIT MISCELLANEOUS
Qty: 1 KIT | Refills: 0 | Status: SHIPPED | OUTPATIENT
Start: 2018-01-05 | End: 2019-11-19 | Stop reason: SDUPTHER

## 2018-01-05 NOTE — PROGRESS NOTES
heart sounds. Pulmonary/Chest: Effort normal and breath sounds normal.   Musculoskeletal:        Right knee: He exhibits swelling and effusion. He exhibits no erythema. No tenderness found. Assessment:      1. PAD (peripheral artery disease) ([address]     2. Essential hypertension  Blood Pressure KIT   3. Chronic pain of right knee  Dennis Breen., Miguel Ferrell MD (Sports,Knee)   4. Need for vaccination with 13-polyvalent pneumococcal conjugate vaccine  Pneumococcal conjugate vaccine 13-valent           Plan:      Refill medications  bp elevated. He needs to begin checking bp at home- home kit ordered.  Advised pt to record and bring to next appt  Begin xarelto 20 mg once daily  Refer to ortho   Give prevnar 13  RTC 3 months

## 2018-01-08 ENCOUNTER — TELEPHONE (OUTPATIENT)
Dept: INTERNAL MEDICINE CLINIC | Age: 68
End: 2018-01-08

## 2018-01-09 ENCOUNTER — OFFICE VISIT (OUTPATIENT)
Dept: ORTHOPEDIC SURGERY | Age: 68
End: 2018-01-09

## 2018-01-09 ENCOUNTER — TELEPHONE (OUTPATIENT)
Dept: ORTHOPEDIC SURGERY | Age: 68
End: 2018-01-09

## 2018-01-09 VITALS
BODY MASS INDEX: 28.28 KG/M2 | SYSTOLIC BLOOD PRESSURE: 164 MMHG | HEIGHT: 70 IN | WEIGHT: 197.53 LBS | DIASTOLIC BLOOD PRESSURE: 91 MMHG | HEART RATE: 90 BPM

## 2018-01-09 DIAGNOSIS — M25.561 RIGHT KNEE PAIN, UNSPECIFIED CHRONICITY: Primary | ICD-10-CM

## 2018-01-09 DIAGNOSIS — M17.12 PRIMARY OSTEOARTHRITIS OF LEFT KNEE: ICD-10-CM

## 2018-01-09 DIAGNOSIS — M17.11 PRIMARY OSTEOARTHRITIS OF RIGHT KNEE: ICD-10-CM

## 2018-01-09 DIAGNOSIS — M23.262 DERANGEMENT OF OTHER LATERAL MENISCUS DUE TO OLD TEAR OR INJURY, LEFT KNEE: ICD-10-CM

## 2018-01-09 PROCEDURE — 1036F TOBACCO NON-USER: CPT | Performed by: ORTHOPAEDIC SURGERY

## 2018-01-09 PROCEDURE — 99203 OFFICE O/P NEW LOW 30 MIN: CPT | Performed by: ORTHOPAEDIC SURGERY

## 2018-01-09 PROCEDURE — G8419 CALC BMI OUT NRM PARAM NOF/U: HCPCS | Performed by: ORTHOPAEDIC SURGERY

## 2018-01-09 PROCEDURE — 73564 X-RAY EXAM KNEE 4 OR MORE: CPT | Performed by: ORTHOPAEDIC SURGERY

## 2018-01-09 PROCEDURE — 1123F ACP DISCUSS/DSCN MKR DOCD: CPT | Performed by: ORTHOPAEDIC SURGERY

## 2018-01-09 PROCEDURE — G8484 FLU IMMUNIZE NO ADMIN: HCPCS | Performed by: ORTHOPAEDIC SURGERY

## 2018-01-09 PROCEDURE — G8427 DOCREV CUR MEDS BY ELIG CLIN: HCPCS | Performed by: ORTHOPAEDIC SURGERY

## 2018-01-09 PROCEDURE — 3017F COLORECTAL CA SCREEN DOC REV: CPT | Performed by: ORTHOPAEDIC SURGERY

## 2018-01-09 PROCEDURE — 4040F PNEUMOC VAC/ADMIN/RCVD: CPT | Performed by: ORTHOPAEDIC SURGERY

## 2018-01-09 RX ORDER — SPIRONOLACTONE 50 MG/1
50 TABLET, FILM COATED ORAL DAILY
Qty: 30 TABLET | Refills: 3 | Status: SHIPPED | OUTPATIENT
Start: 2018-01-09 | End: 2018-04-26 | Stop reason: SDUPTHER

## 2018-01-10 ENCOUNTER — TELEPHONE (OUTPATIENT)
Dept: ORTHOPEDIC SURGERY | Age: 68
End: 2018-01-10

## 2018-01-23 ENCOUNTER — OFFICE VISIT (OUTPATIENT)
Dept: ORTHOPEDIC SURGERY | Age: 68
End: 2018-01-23

## 2018-01-23 VITALS — WEIGHT: 197.53 LBS | BODY MASS INDEX: 28.28 KG/M2 | HEIGHT: 70 IN

## 2018-01-23 DIAGNOSIS — M17.11 PRIMARY OSTEOARTHRITIS OF RIGHT KNEE: Primary | Chronic | ICD-10-CM

## 2018-01-23 DIAGNOSIS — M23.231 DERANG OF MEDIAL MENISCUS DUE TO OLD TEAR/INJ, RIGHT KNEE: ICD-10-CM

## 2018-01-23 PROCEDURE — 20610 DRAIN/INJ JOINT/BURSA W/O US: CPT | Performed by: ORTHOPAEDIC SURGERY

## 2018-01-23 NOTE — PATIENT INSTRUCTIONS
Plan explained to patient.       Joint Injections: Care Instructions  Your Care Instructions    Joint injections are shots into a joint, such as the knee. They may be used to put in medicines, such as pain relievers. A corticosteroid, or steroid, shot is used to reduce inflammation in tendons or joints. It is often used to treat problems such as arthritis, tendinitis, and bursitis. Steroids can be injected directly into a painful, inflamed joint. They can also help reduce inflammation of a bursa. A bursa is a sac of fluid. It cushions and lubricates areas where tendons, ligaments, skin, muscles, or bones rub against each other. A steroid shot can sometimes help with short-term pain relief when other treatments haven't worked. If steroid shots help, pain may improve for weeks or months. Follow-up care is a key part of your treatment and safety. Be sure to make and go to all appointments, and call your doctor if you are having problems. It's also a good idea to know your test results and keep a list of the medicines you take. How can you care for yourself at home? · Put ice or a cold pack on the area for 10 to 20 minutes at a time. Put a thin cloth between the ice and your skin. · Ask your doctor if you can take an over-the-counter pain medicine, such as acetaminophen (Tylenol), ibuprofen (Advil, Motrin), or naproxen (Aleve). Be safe with medicines. Read and follow all instructions on the label. · Avoid strenuous activities for several days. In particular, avoid ones that put stress on the area where you got the shot. · If you have dressings over the area, keep them clean and dry. You may remove them when your doctor tells you to. When should you call for help? Call your doctor now or seek immediate medical care if:  ? · You have signs of infection, such as:  ? Increased pain, swelling, warmth, or redness. ? Red streaks leading from the site. ? Pus draining from the site. ? A fever. ? Watch closely for

## 2018-01-23 NOTE — PROGRESS NOTES
MRI scan reveals marrow edema within the lateral femur including the condyle metaphysis for which contusion or stress injuries favored. Lateral meniscus is intact. He has intermediate to high-grade patellofemoral arthritis. He does have a subtle free edge tear the posterior horn of the medial meniscus. His symptoms appear primarily to be anterior. There is negative Fela sign today laterally. He has minimal to no tenderness medially and negative Fela sign. Ligamentous stability is good. Review of systems January 9, 2018 basically unchanged. This point we talked about the options treatment and he understands the risks and benefits of going ahead with viscous supplementation. The patient returns today for their first Euflexxa injection to the right knee for diagnosis of osteoarthritis. . The risks, benefits, and complications of the injections were again discussed in detail with the patient. The risks discussed included but are not limited to infection, skin reactions, hot swollen joints, and anaphylaxis. The patient gave verbal informed consent for the injection. The patient skin was prepped with iodine and sterile 4x4 gauze pad and the right knee joint was injected with 2 ml of Euflexxa intra-articularly under sterile conditions  into the supra-lateral pouch. The patient tolerated the injection reasonably well. The patient was given instructions to ice the right knee and avoid strenuous activities for 24-48 hours. The patient was instructed to call the office immediately if there is increased pain, redness, warmth, fever, or chills. We will see the patient back in [one week] for their second injection.

## 2018-01-25 ENCOUNTER — PATIENT MESSAGE (OUTPATIENT)
Dept: INTERNAL MEDICINE CLINIC | Age: 68
End: 2018-01-25

## 2018-01-26 RX ORDER — CARVEDILOL 6.25 MG/1
6.25 TABLET ORAL 2 TIMES DAILY WITH MEALS
Qty: 60 TABLET | Refills: 3 | Status: SHIPPED | OUTPATIENT
Start: 2018-01-26 | End: 2018-05-25 | Stop reason: SDUPTHER

## 2018-01-30 ENCOUNTER — OFFICE VISIT (OUTPATIENT)
Dept: ORTHOPEDIC SURGERY | Age: 68
End: 2018-01-30

## 2018-01-30 DIAGNOSIS — M17.11 PRIMARY OSTEOARTHRITIS OF RIGHT KNEE: Primary | Chronic | ICD-10-CM

## 2018-01-30 PROCEDURE — 20610 DRAIN/INJ JOINT/BURSA W/O US: CPT | Performed by: ORTHOPAEDIC SURGERY

## 2018-02-06 ENCOUNTER — OFFICE VISIT (OUTPATIENT)
Dept: ORTHOPEDIC SURGERY | Age: 68
End: 2018-02-06

## 2018-02-06 ENCOUNTER — TELEPHONE (OUTPATIENT)
Dept: INTERNAL MEDICINE CLINIC | Age: 68
End: 2018-02-06

## 2018-02-06 DIAGNOSIS — M17.11 PRIMARY OSTEOARTHRITIS OF RIGHT KNEE: Primary | Chronic | ICD-10-CM

## 2018-02-06 DIAGNOSIS — Z11.4 SCREENING FOR HIV (HUMAN IMMUNODEFICIENCY VIRUS): Primary | ICD-10-CM

## 2018-02-06 PROCEDURE — 20610 DRAIN/INJ JOINT/BURSA W/O US: CPT | Performed by: ORTHOPAEDIC SURGERY

## 2018-02-06 NOTE — TELEPHONE ENCOUNTER
I can put the order in. What would be the diagnosis for testing for HIV?   Please Advise,  Thank you

## 2018-02-09 ENCOUNTER — NURSE ONLY (OUTPATIENT)
Dept: INTERNAL MEDICINE CLINIC | Age: 68
End: 2018-02-09

## 2018-02-09 DIAGNOSIS — Z11.4 SCREENING FOR HIV (HUMAN IMMUNODEFICIENCY VIRUS): ICD-10-CM

## 2018-02-09 DIAGNOSIS — Z23 NEED FOR TDAP VACCINATION: Primary | ICD-10-CM

## 2018-02-09 PROCEDURE — 90715 TDAP VACCINE 7 YRS/> IM: CPT | Performed by: NURSE PRACTITIONER

## 2018-02-09 PROCEDURE — 90471 IMMUNIZATION ADMIN: CPT | Performed by: NURSE PRACTITIONER

## 2018-02-12 LAB
HIV AG/AB: NORMAL
HIV ANTIGEN: NORMAL
HIV-1 ANTIBODY: NORMAL
HIV-2 AB: NORMAL

## 2018-03-13 ENCOUNTER — TELEPHONE (OUTPATIENT)
Dept: SURGERY | Age: 68
End: 2018-03-13

## 2018-03-13 NOTE — TELEPHONE ENCOUNTER
Patient states he was to be scheduled for vein mapping Monday 3/19 patient states he had been working with Maria D Harmon in December. I don't see where patient has been scheduled. Please advise. Thank you!     Jazmín Page [phone] (home)

## 2018-03-15 ENCOUNTER — TELEPHONE (OUTPATIENT)
Dept: INTERNAL MEDICINE CLINIC | Age: 68
End: 2018-03-15

## 2018-03-15 NOTE — TELEPHONE ENCOUNTER
LAST SEEN       NEXT APPOINTMENT       LAST FILL  1.5.18   None   2.8.18      Please Advise,  Thank you

## 2018-03-19 ENCOUNTER — HOSPITAL ENCOUNTER (OUTPATIENT)
Dept: VASCULAR LAB | Age: 68
Discharge: HOME | End: 2018-03-19
Attending: SURGERY | Admitting: SURGERY

## 2018-03-19 DIAGNOSIS — I82.4Y1 ACUTE EMBOLISM AND THROMBOSIS OF DEEP VEIN OF RIGHT PROXIMAL LOWER EXTREMITY (HCC): ICD-10-CM

## 2018-03-20 ENCOUNTER — OFFICE VISIT (OUTPATIENT)
Dept: VASCULAR SURGERY | Age: 68
End: 2018-03-20

## 2018-03-20 VITALS
BODY MASS INDEX: 30.32 KG/M2 | SYSTOLIC BLOOD PRESSURE: 136 MMHG | HEART RATE: 74 BPM | DIASTOLIC BLOOD PRESSURE: 77 MMHG | WEIGHT: 211.8 LBS | HEIGHT: 70 IN | TEMPERATURE: 97.6 F

## 2018-03-20 DIAGNOSIS — I73.9 PAD (PERIPHERAL ARTERY DISEASE) (HCC): Primary | ICD-10-CM

## 2018-03-20 PROCEDURE — 99213 OFFICE O/P EST LOW 20 MIN: CPT | Performed by: SURGERY

## 2018-04-24 RX ORDER — FUROSEMIDE 20 MG/1
20 TABLET ORAL DAILY
Qty: 30 TABLET | Refills: 3 | Status: SHIPPED | OUTPATIENT
Start: 2018-04-24 | End: 2018-09-07 | Stop reason: SDUPTHER

## 2018-04-26 RX ORDER — SPIRONOLACTONE 50 MG/1
50 TABLET, FILM COATED ORAL DAILY
Qty: 30 TABLET | Refills: 0 | Status: SHIPPED | OUTPATIENT
Start: 2018-04-26 | End: 2019-04-22

## 2018-05-25 RX ORDER — CARVEDILOL 6.25 MG/1
6.25 TABLET ORAL 2 TIMES DAILY WITH MEALS
Qty: 60 TABLET | Refills: 3 | Status: SHIPPED | OUTPATIENT
Start: 2018-05-25 | End: 2018-10-09 | Stop reason: SDUPTHER

## 2018-09-04 ENCOUNTER — TELEPHONE (OUTPATIENT)
Dept: VASCULAR SURGERY | Age: 68
End: 2018-09-04

## 2018-09-07 RX ORDER — FUROSEMIDE 20 MG/1
20 TABLET ORAL DAILY
Qty: 30 TABLET | Refills: 0 | Status: SHIPPED | OUTPATIENT
Start: 2018-09-07 | End: 2018-10-09 | Stop reason: SDUPTHER

## 2018-09-20 ENCOUNTER — TELEPHONE (OUTPATIENT)
Dept: SURGERY | Age: 68
End: 2018-09-20

## 2018-09-20 ENCOUNTER — OFFICE VISIT (OUTPATIENT)
Dept: VASCULAR SURGERY | Age: 68
End: 2018-09-20

## 2018-09-20 VITALS
SYSTOLIC BLOOD PRESSURE: 163 MMHG | WEIGHT: 215 LBS | TEMPERATURE: 98.8 F | DIASTOLIC BLOOD PRESSURE: 89 MMHG | BODY MASS INDEX: 30.78 KG/M2 | HEIGHT: 70 IN

## 2018-09-20 DIAGNOSIS — I73.9 PAD (PERIPHERAL ARTERY DISEASE) (HCC): Primary | ICD-10-CM

## 2018-09-20 DIAGNOSIS — Z95.828 STATUS POST INSERTION OF ILIAC ARTERY STENT: ICD-10-CM

## 2018-09-20 PROCEDURE — 1123F ACP DISCUSS/DSCN MKR DOCD: CPT | Performed by: SURGERY

## 2018-09-20 PROCEDURE — 99214 OFFICE O/P EST MOD 30 MIN: CPT | Performed by: SURGERY

## 2018-09-20 PROCEDURE — 1036F TOBACCO NON-USER: CPT | Performed by: SURGERY

## 2018-09-20 PROCEDURE — G8417 CALC BMI ABV UP PARAM F/U: HCPCS | Performed by: SURGERY

## 2018-09-20 PROCEDURE — G8427 DOCREV CUR MEDS BY ELIG CLIN: HCPCS | Performed by: SURGERY

## 2018-09-20 PROCEDURE — 4040F PNEUMOC VAC/ADMIN/RCVD: CPT | Performed by: SURGERY

## 2018-09-20 PROCEDURE — 3017F COLORECTAL CA SCREEN DOC REV: CPT | Performed by: SURGERY

## 2018-09-20 PROCEDURE — 1101F PT FALLS ASSESS-DOCD LE1/YR: CPT | Performed by: SURGERY

## 2018-09-20 NOTE — PROGRESS NOTES
[facility]) Vascular Surgery--Maxine Okeefe Ruth, [place], [facility], [address]    Follow-up    Referring Provider:  ANGELITA Rivera CNP     Patient Name: Kinjal Chun  YOB: 1950  Date: 09/20/18    History:  Kinjal Chun is a 79 y.o. male being followed for PAD. He underwent right iliac angioplasty and stent placement on 12/8/2017. He is doing well. He just returned from vacation. He denies any problems walking. He denies any pain in the legs. Denies swelling. He also has a history of right leg DVT which was treated in 2017 with anticoagulation and he has also had IVC filter placement. He returns in follow-up today and was to have a surveillance duplex scan. Unfortunately, despite this being ordered for him and also multiple phone calls left for him to complete this prior to his visit today, he did not have the arterial duplex study performed. Vital Signs  BP (!) 163/89   Temp 98.8 °F (37.1 °C)   Ht 5' 10\" (1.778 m)   Wt 215 lb (97.5 kg)   BMI 30.85 kg/m²     Physical Examination:  General:  alert and oriented to person, place and time, well-developed and well-nourished, in no acute distress  Heart: RRR no m/r/g  Lungs:  CTA B no w/r/r  Abdomen: soft, NT/ND. Extremities: Bilateral lower extremities are warm and well perfused. No swelling. No cyanosis or ulceration noted. Bilateral femoral pulses 2+. Right dorsalis posterior tibial found by Doppler evaluation. Left dorsalis pedis and posterior tibial is 1+ palpable. Skin/integumentary: Skin color, texture, turgor normal. No rashes or lesions. Non-invasive vascular studies:  Pending    Impression:  Stable peripheral artery disease. Status post right iliac angioplasty and stent, he December 2017. Plan:  Reschedule bilateral lower extremity arterial duplex and aortoiliac duplex study. I will call him with the results.

## 2018-09-20 NOTE — TELEPHONE ENCOUNTER
----- Message from Damian Chavarria MD sent at 9/20/2018  1:55 PM EDT -----  Can cancel this appt. He needs to schedule and have duplex done. I will call him with results--does not need to be seen in office. Thanks.

## 2018-10-01 ENCOUNTER — HOSPITAL ENCOUNTER (OUTPATIENT)
Dept: VASCULAR LAB | Age: 68
Discharge: HOME | End: 2018-10-01
Payer: MEDICARE

## 2018-10-01 DIAGNOSIS — I73.9 PAD (PERIPHERAL ARTERY DISEASE) (HCC): ICD-10-CM

## 2018-10-01 PROCEDURE — 93923 UPR/LXTR ART STDY 3+ LVLS: CPT

## 2018-10-03 ENCOUNTER — TELEPHONE (OUTPATIENT)
Dept: VASCULAR SURGERY | Age: 68
End: 2018-10-03

## 2018-10-09 RX ORDER — CARVEDILOL 6.25 MG/1
TABLET ORAL
Qty: 60 TABLET | Refills: 0 | Status: SHIPPED | OUTPATIENT
Start: 2018-10-09 | End: 2018-10-23 | Stop reason: SDUPTHER

## 2018-10-09 RX ORDER — FUROSEMIDE 20 MG/1
TABLET ORAL
Qty: 30 TABLET | Refills: 0 | Status: SHIPPED | OUTPATIENT
Start: 2018-10-09 | End: 2018-10-23 | Stop reason: SDUPTHER

## 2018-10-23 ENCOUNTER — OFFICE VISIT (OUTPATIENT)
Dept: PRIMARY CARE CLINIC | Age: 68
End: 2018-10-23
Payer: MEDICARE

## 2018-10-23 VITALS
TEMPERATURE: 98.4 F | BODY MASS INDEX: 31.4 KG/M2 | DIASTOLIC BLOOD PRESSURE: 82 MMHG | HEART RATE: 76 BPM | WEIGHT: 212 LBS | HEIGHT: 69 IN | SYSTOLIC BLOOD PRESSURE: 138 MMHG

## 2018-10-23 DIAGNOSIS — Z00.00 MEDICARE ANNUAL WELLNESS VISIT, INITIAL: ICD-10-CM

## 2018-10-23 DIAGNOSIS — Z23 FLU VACCINE NEED: ICD-10-CM

## 2018-10-23 DIAGNOSIS — Z00.00 ROUTINE GENERAL MEDICAL EXAMINATION AT A HEALTH CARE FACILITY: ICD-10-CM

## 2018-10-23 DIAGNOSIS — Z23 NEED FOR PROPHYLACTIC VACCINATION AND INOCULATION AGAINST VARICELLA: ICD-10-CM

## 2018-10-23 DIAGNOSIS — Z12.11 COLON CANCER SCREENING: ICD-10-CM

## 2018-10-23 DIAGNOSIS — Z23 NEED FOR HEPATITIS B VACCINATION: ICD-10-CM

## 2018-10-23 DIAGNOSIS — Z01.00 ENCOUNTER FOR VISION SCREENING: ICD-10-CM

## 2018-10-23 PROCEDURE — G8482 FLU IMMUNIZE ORDER/ADMIN: HCPCS | Performed by: NURSE PRACTITIONER

## 2018-10-23 PROCEDURE — 90662 IIV NO PRSV INCREASED AG IM: CPT | Performed by: NURSE PRACTITIONER

## 2018-10-23 PROCEDURE — G0008 ADMIN INFLUENZA VIRUS VAC: HCPCS | Performed by: NURSE PRACTITIONER

## 2018-10-23 PROCEDURE — 4040F PNEUMOC VAC/ADMIN/RCVD: CPT | Performed by: NURSE PRACTITIONER

## 2018-10-23 PROCEDURE — 90746 HEPB VACCINE 3 DOSE ADULT IM: CPT | Performed by: NURSE PRACTITIONER

## 2018-10-23 PROCEDURE — G0438 PPPS, INITIAL VISIT: HCPCS | Performed by: NURSE PRACTITIONER

## 2018-10-23 PROCEDURE — G0010 ADMIN HEPATITIS B VACCINE: HCPCS | Performed by: NURSE PRACTITIONER

## 2018-10-23 RX ORDER — CARVEDILOL 6.25 MG/1
TABLET ORAL
Qty: 60 TABLET | Refills: 7 | Status: SHIPPED | OUTPATIENT
Start: 2018-10-23 | End: 2019-06-27 | Stop reason: SDUPTHER

## 2018-10-23 RX ORDER — FUROSEMIDE 20 MG/1
TABLET ORAL
Qty: 30 TABLET | Refills: 7 | Status: SHIPPED | OUTPATIENT
Start: 2018-10-23 | End: 2019-06-27 | Stop reason: SDUPTHER

## 2018-10-23 RX ORDER — CHLORAL HYDRATE 500 MG
3000 CAPSULE ORAL DAILY
COMMUNITY

## 2018-10-23 RX ORDER — ASPIRIN 325 MG
325 TABLET ORAL DAILY
COMMUNITY
End: 2019-11-19 | Stop reason: SDUPTHER

## 2018-10-23 ASSESSMENT — PATIENT HEALTH QUESTIONNAIRE - PHQ9
SUM OF ALL RESPONSES TO PHQ QUESTIONS 1-9: 0
SUM OF ALL RESPONSES TO PHQ QUESTIONS 1-9: 0

## 2018-10-23 ASSESSMENT — ANXIETY QUESTIONNAIRES: GAD7 TOTAL SCORE: 0

## 2018-10-23 ASSESSMENT — LIFESTYLE VARIABLES: HOW OFTEN DO YOU HAVE A DRINK CONTAINING ALCOHOL: 0

## 2018-10-23 NOTE — PROGRESS NOTES
Olena Car MD   umeclidinium-vilanterol (ANORO ELLIPTA) 62.5-25 MCG/INH AEPB inhaler Inhale 1 puff into the lungs daily  Beatriz Sanchez MD   apixaban (ELIQUIS) 5 MG TABS tablet Take 1 tablet by mouth 2 times daily  ANGELITA Brown CNP   aspirin 81 MG EC tablet Take 1 tablet by mouth daily  ANGELITA Brown CNP   Blood Pressure KIT Check once daily  ANGELITA Brown CNP   albuterol sulfate HFA (PROAIR HFA) 108 (90 Base) MCG/ACT inhaler Inhale 2 puffs into the lungs every 6 hours as needed for Wheezing  ANGELITA Brown CNP   mupirocin (BACTROBAN) 2 % ointment Apply topically daily Apply topically 3 times daily. Colonel Kimmie MD   diclofenac sodium 1 % GEL Apply 2 g topically 2 times daily  Colonel Kimmie MD   lidocaine (LIDODERM) 5 % Place 1 patch onto the skin every 12 hours 12 hours on, 12 hours off.   Colonel Kimmie MD       Past Medical History:   Diagnosis Date    COPD (chronic obstructive pulmonary disease) ([facility] [address]     History of blood transfusion     Hx of blood clots     Hyperlipidemia     Hypertension     Primary osteoarthritis of right knee 1/9/2018     Past Surgical History:   Procedure Laterality Date    OTHER SURGICAL HISTORY  10/16/2017    Dr. Alma KELLER for gastric varices    UPPER GASTROINTESTINAL ENDOSCOPY  10/17/2017    Dr. Ramirez Null - moderate-large varix in cardia/fundus w/tiny nipple, no bleeding    UPPER GASTROINTESTINAL ENDOSCOPY  10/16/2017    Dr. Ramirez Null - few tiny antral erosions, biopsies done for H pylori, large varices in cardia       Family History   Problem Relation Age of Onset    High Blood Pressure Father        CareTeam (Including outside providers/suppliers regularly involved in providing care):   Patient Care Team:  ANGELITA Brown CNP as PCP - General (Family Nurse Practitioner)  ANGELITA Brown CNP as PCP - [facility] Attributed Provider    Wt Readings from Last 3 Encounters:   10/23/18 212 lb (96.2 kg)   09/20/18 215 lb (97.5 kg)   03/20/18 211 lb 12.8 oz (96.1 kg)     Vitals:    10/23/18 0919   BP: 138/82   Cuff Size: Medium Adult   Pulse: 76   Temp: 98.4 °F (36.9 °C)   TempSrc: Oral   Weight: 212 lb (96.2 kg)   Height: 5' 9\" (1.753 m)     Body mass index is 31.31 kg/m². Patient's complete Health Risk Assessment and screening values have been reviewed and are found in Flowsheets. The following problems were reviewed today and where indicated follow up appointments were made and/or referrals ordered. Positive Risk Factor Screenings with Interventions:     General Health:  General  In general, how would you say your health is?: Good  In the past 7 days, have you experienced any of the following?: (!) New or Increased Pain, None of These  Do you get the social and emotional support that you need?: Yes  Do you have a Living Will?: (!) No  General Health Risk Interventions:  · No Living Will: provided the state-specific advance directive document to the patient    Health Habits/Nutrition:  Health Habits/Nutrition  Do you exercise for at least 20 minutes 2-3 times per week?: Yes  Have you lost any weight without trying in the past 3 months?: No  Do you eat fewer than 2 meals per day?: No  Have you seen a dentist within the past year?: Yes  Body mass index is 31.31 kg/m².   Health Habits/Nutrition Interventions:  Hearing/Vision Interventions:  · Vision concerns:  patient encouraged to make appointment with his/her eye specialist, ophthalmology/optometry referral provided    Safety:  Safety  Do you have working smoke detectors?: Yes  Have all throw rugs been removed or fastened?: Yes  Do you have non-slip mats in all bathtubs?: (!) No  Do all of your stairways have a railing or banister?: Yes  Are your doorways, halls and stairs free of clutter?: Yes  Do you always fasten your seatbelt when you are in a car?: Yes  Safety Interventions:  · Home safety tips provided    Personalized Preventive Plan   Current Health

## 2018-11-05 ENCOUNTER — TELEPHONE (OUTPATIENT)
Dept: GASTROENTEROLOGY | Age: 68
End: 2018-11-05

## 2018-11-05 DIAGNOSIS — Z12.11 ENCOUNTER FOR SCREENING COLONOSCOPY: Primary | ICD-10-CM

## 2018-11-05 RX ORDER — POLYETHYLENE GLYCOL 3350 17 G/17G
POWDER, FOR SOLUTION ORAL
Qty: 255 G | Refills: 0 | Status: SHIPPED | OUTPATIENT
Start: 2018-11-05 | End: 2019-11-19 | Stop reason: SDUPTHER

## 2018-11-19 ENCOUNTER — NURSE ONLY (OUTPATIENT)
Dept: PRIMARY CARE CLINIC | Age: 68
End: 2018-11-19
Payer: MEDICARE

## 2018-11-19 DIAGNOSIS — Z23 NEED FOR HEPATITIS B VACCINATION: Primary | ICD-10-CM

## 2018-11-19 PROCEDURE — 90746 HEPB VACCINE 3 DOSE ADULT IM: CPT | Performed by: NURSE PRACTITIONER

## 2018-11-19 PROCEDURE — G0010 ADMIN HEPATITIS B VACCINE: HCPCS | Performed by: NURSE PRACTITIONER

## 2018-11-21 ENCOUNTER — TELEPHONE (OUTPATIENT)
Dept: GASTROENTEROLOGY | Age: 68
End: 2018-11-21

## 2019-04-22 ENCOUNTER — OFFICE VISIT (OUTPATIENT)
Dept: PRIMARY CARE CLINIC | Age: 69
End: 2019-04-22
Payer: MEDICARE

## 2019-04-22 VITALS
HEIGHT: 69 IN | OXYGEN SATURATION: 97 % | DIASTOLIC BLOOD PRESSURE: 78 MMHG | SYSTOLIC BLOOD PRESSURE: 150 MMHG | WEIGHT: 212 LBS | BODY MASS INDEX: 31.4 KG/M2 | HEART RATE: 78 BPM

## 2019-04-22 DIAGNOSIS — I73.9 PAD (PERIPHERAL ARTERY DISEASE) (HCC): ICD-10-CM

## 2019-04-22 DIAGNOSIS — Z23 NEED FOR PROPHYLACTIC VACCINATION AGAINST STREPTOCOCCUS PNEUMONIAE (PNEUMOCOCCUS): ICD-10-CM

## 2019-04-22 DIAGNOSIS — Z23 NEED FOR PROPHYLACTIC VACCINATION AND INOCULATION AGAINST VARICELLA: ICD-10-CM

## 2019-04-22 DIAGNOSIS — L98.9 SKIN ABNORMALITIES: ICD-10-CM

## 2019-04-22 DIAGNOSIS — J44.9 COPD, MILD (HCC): ICD-10-CM

## 2019-04-22 DIAGNOSIS — I10 ESSENTIAL HYPERTENSION: Primary | ICD-10-CM

## 2019-04-22 DIAGNOSIS — Z12.11 SCREENING FOR COLON CANCER: ICD-10-CM

## 2019-04-22 PROCEDURE — G0009 ADMIN PNEUMOCOCCAL VACCINE: HCPCS | Performed by: NURSE PRACTITIONER

## 2019-04-22 PROCEDURE — 99213 OFFICE O/P EST LOW 20 MIN: CPT | Performed by: NURSE PRACTITIONER

## 2019-04-22 PROCEDURE — G8417 CALC BMI ABV UP PARAM F/U: HCPCS | Performed by: NURSE PRACTITIONER

## 2019-04-22 PROCEDURE — G8427 DOCREV CUR MEDS BY ELIG CLIN: HCPCS | Performed by: NURSE PRACTITIONER

## 2019-04-22 PROCEDURE — G0010 ADMIN HEPATITIS B VACCINE: HCPCS | Performed by: NURSE PRACTITIONER

## 2019-04-22 PROCEDURE — 1036F TOBACCO NON-USER: CPT | Performed by: NURSE PRACTITIONER

## 2019-04-22 PROCEDURE — 1123F ACP DISCUSS/DSCN MKR DOCD: CPT | Performed by: NURSE PRACTITIONER

## 2019-04-22 PROCEDURE — 90732 PPSV23 VACC 2 YRS+ SUBQ/IM: CPT | Performed by: NURSE PRACTITIONER

## 2019-04-22 PROCEDURE — 3023F SPIROM DOC REV: CPT | Performed by: NURSE PRACTITIONER

## 2019-04-22 PROCEDURE — 90746 HEPB VACCINE 3 DOSE ADULT IM: CPT | Performed by: NURSE PRACTITIONER

## 2019-04-22 PROCEDURE — 3017F COLORECTAL CA SCREEN DOC REV: CPT | Performed by: NURSE PRACTITIONER

## 2019-04-22 PROCEDURE — 4040F PNEUMOC VAC/ADMIN/RCVD: CPT | Performed by: NURSE PRACTITIONER

## 2019-04-22 PROCEDURE — G8926 SPIRO NO PERF OR DOC: HCPCS | Performed by: NURSE PRACTITIONER

## 2019-04-22 RX ORDER — LISINOPRIL 10 MG/1
10 TABLET ORAL DAILY
Qty: 30 TABLET | Refills: 3 | Status: SHIPPED | OUTPATIENT
Start: 2019-04-22 | End: 2019-07-17 | Stop reason: SDUPTHER

## 2019-04-29 DIAGNOSIS — I10 ESSENTIAL HYPERTENSION: ICD-10-CM

## 2019-04-29 LAB
ANION GAP SERPL CALCULATED.3IONS-SCNC: 12 MMOL/L (ref 3–16)
BUN BLDV-MCNC: 9 MG/DL (ref 7–20)
CALCIUM SERPL-MCNC: 10 MG/DL (ref 8.3–10.6)
CHLORIDE BLD-SCNC: 101 MMOL/L (ref 99–110)
CHOLESTEROL, FASTING: 172 MG/DL (ref 0–199)
CO2: 26 MMOL/L (ref 21–32)
CREAT SERPL-MCNC: 0.9 MG/DL (ref 0.8–1.3)
GFR AFRICAN AMERICAN: >60
GFR NON-AFRICAN AMERICAN: >60
GLUCOSE BLD-MCNC: 129 MG/DL (ref 70–99)
HDLC SERPL-MCNC: 44 MG/DL (ref 40–60)
LDL CHOLESTEROL CALCULATED: 103 MG/DL
POTASSIUM SERPL-SCNC: 5 MMOL/L (ref 3.5–5.1)
SODIUM BLD-SCNC: 139 MMOL/L (ref 136–145)
TRIGLYCERIDE, FASTING: 123 MG/DL (ref 0–150)
VLDLC SERPL CALC-MCNC: 25 MG/DL

## 2019-06-27 RX ORDER — FUROSEMIDE 20 MG/1
TABLET ORAL
Qty: 30 TABLET | Refills: 3 | Status: SHIPPED | OUTPATIENT
Start: 2019-06-27 | End: 2019-10-28 | Stop reason: SDUPTHER

## 2019-06-27 NOTE — TELEPHONE ENCOUNTER
Requested Prescriptions     Pending Prescriptions Disp Refills    furosemide (LASIX) 20 MG tablet [Pharmacy Med Name: FUROSEMIDE 20 MG TABLET] 30 tablet 7     Sig: TAKE 1 TABLET BY MOUTH EVERY DAY     LAST REFILL 10/23/18  LAST AMOUNT 30         7 REFILLS     Last  Office visit 4/22/2019  Next office visit   Seeing new pcp 10/7/2019

## 2019-07-17 RX ORDER — LISINOPRIL 10 MG/1
TABLET ORAL
Qty: 90 TABLET | Refills: 0 | Status: SHIPPED | OUTPATIENT
Start: 2019-07-17 | End: 2019-10-18 | Stop reason: SDUPTHER

## 2019-10-21 RX ORDER — LISINOPRIL 10 MG/1
TABLET ORAL
Qty: 90 TABLET | Refills: 0 | Status: SHIPPED | OUTPATIENT
Start: 2019-10-21 | End: 2019-11-19 | Stop reason: SDUPTHER

## 2019-10-28 RX ORDER — FUROSEMIDE 20 MG/1
TABLET ORAL
Qty: 30 TABLET | Refills: 0 | Status: SHIPPED | OUTPATIENT
Start: 2019-10-28 | End: 2019-11-19 | Stop reason: DRUGHIGH

## 2019-10-28 RX ORDER — CARVEDILOL 6.25 MG/1
TABLET ORAL
Qty: 60 TABLET | Refills: 0 | Status: SHIPPED | OUTPATIENT
Start: 2019-10-28 | End: 2019-11-19 | Stop reason: SDUPTHER

## 2019-10-31 ENCOUNTER — OFFICE VISIT (OUTPATIENT)
Dept: DERMATOLOGY | Age: 69
End: 2019-10-31
Payer: MEDICARE

## 2019-10-31 DIAGNOSIS — D48.5 NEOPLASM OF UNCERTAIN BEHAVIOR OF SKIN: Primary | ICD-10-CM

## 2019-10-31 PROCEDURE — 1123F ACP DISCUSS/DSCN MKR DOCD: CPT | Performed by: DERMATOLOGY

## 2019-10-31 PROCEDURE — 1036F TOBACCO NON-USER: CPT | Performed by: DERMATOLOGY

## 2019-10-31 PROCEDURE — G8417 CALC BMI ABV UP PARAM F/U: HCPCS | Performed by: DERMATOLOGY

## 2019-10-31 PROCEDURE — G8484 FLU IMMUNIZE NO ADMIN: HCPCS | Performed by: DERMATOLOGY

## 2019-10-31 PROCEDURE — 11102 TANGNTL BX SKIN SINGLE LES: CPT | Performed by: DERMATOLOGY

## 2019-10-31 PROCEDURE — G8427 DOCREV CUR MEDS BY ELIG CLIN: HCPCS | Performed by: DERMATOLOGY

## 2019-10-31 PROCEDURE — 3017F COLORECTAL CA SCREEN DOC REV: CPT | Performed by: DERMATOLOGY

## 2019-10-31 PROCEDURE — 4040F PNEUMOC VAC/ADMIN/RCVD: CPT | Performed by: DERMATOLOGY

## 2019-10-31 PROCEDURE — 99202 OFFICE O/P NEW SF 15 MIN: CPT | Performed by: DERMATOLOGY

## 2019-10-31 RX ORDER — MULTIVIT-MIN/FERROUS FUMARATE 9 MG/15 ML
LIQUID (ML) ORAL
COMMUNITY
End: 2019-11-19

## 2019-11-04 LAB — DERMATOLOGY PATHOLOGY REPORT: ABNORMAL

## 2019-11-05 ENCOUNTER — TELEPHONE (OUTPATIENT)
Dept: DERMATOLOGY | Age: 69
End: 2019-11-05

## 2019-11-05 DIAGNOSIS — C44.319 BASAL CELL CARCINOMA (BCC) OF FOREHEAD: Primary | ICD-10-CM

## 2019-11-19 ENCOUNTER — OFFICE VISIT (OUTPATIENT)
Dept: PRIMARY CARE CLINIC | Age: 69
End: 2019-11-19
Payer: MEDICARE

## 2019-11-19 VITALS
WEIGHT: 222 LBS | DIASTOLIC BLOOD PRESSURE: 96 MMHG | SYSTOLIC BLOOD PRESSURE: 170 MMHG | BODY MASS INDEX: 32.88 KG/M2 | HEIGHT: 69 IN | HEART RATE: 100 BPM | OXYGEN SATURATION: 97 %

## 2019-11-19 DIAGNOSIS — J44.9 COPD, MILD (HCC): ICD-10-CM

## 2019-11-19 DIAGNOSIS — Z23 NEED FOR SHINGLES VACCINE: ICD-10-CM

## 2019-11-19 DIAGNOSIS — R73.01 ELEVATED FASTING GLUCOSE: ICD-10-CM

## 2019-11-19 DIAGNOSIS — Z23 NEED FOR IMMUNIZATION AGAINST INFLUENZA: ICD-10-CM

## 2019-11-19 DIAGNOSIS — R73.03 BORDERLINE DIABETES: ICD-10-CM

## 2019-11-19 DIAGNOSIS — C44.319 BASAL CELL CARCINOMA (BCC) OF SKIN OF OTHER PART OF FACE: ICD-10-CM

## 2019-11-19 DIAGNOSIS — I10 ESSENTIAL HYPERTENSION: Primary | ICD-10-CM

## 2019-11-19 PROBLEM — Z85.828 HISTORY OF BASAL CELL CARCINOMA: Status: ACTIVE | Noted: 2019-11-19

## 2019-11-19 PROBLEM — C44.91 BASAL CELL CARCINOMA: Status: ACTIVE | Noted: 2019-11-19

## 2019-11-19 LAB — HBA1C MFR BLD: 6.1 %

## 2019-11-19 PROCEDURE — 83036 HEMOGLOBIN GLYCOSYLATED A1C: CPT | Performed by: FAMILY MEDICINE

## 2019-11-19 PROCEDURE — 90662 IIV NO PRSV INCREASED AG IM: CPT | Performed by: FAMILY MEDICINE

## 2019-11-19 PROCEDURE — G0008 ADMIN INFLUENZA VIRUS VAC: HCPCS | Performed by: FAMILY MEDICINE

## 2019-11-19 PROCEDURE — 99214 OFFICE O/P EST MOD 30 MIN: CPT | Performed by: FAMILY MEDICINE

## 2019-11-19 RX ORDER — LISINOPRIL 20 MG/1
20 TABLET ORAL DAILY
Qty: 90 TABLET | Refills: 1 | Status: SHIPPED | OUTPATIENT
Start: 2019-11-19 | End: 2019-12-02 | Stop reason: SDUPTHER

## 2019-11-19 RX ORDER — CARVEDILOL 6.25 MG/1
6.25 TABLET ORAL 2 TIMES DAILY
Qty: 180 TABLET | Refills: 1 | Status: SHIPPED | OUTPATIENT
Start: 2019-11-19 | End: 2019-12-02 | Stop reason: SDUPTHER

## 2019-11-19 RX ORDER — FUROSEMIDE 20 MG/1
TABLET ORAL
Qty: 30 TABLET | Refills: 5 | Status: CANCELLED | OUTPATIENT
Start: 2019-11-19

## 2019-11-19 RX ORDER — ACETAMINOPHEN 500 MG
1000 TABLET ORAL EVERY 6 HOURS PRN
COMMUNITY

## 2019-11-19 RX ORDER — ASPIRIN 325 MG
325 TABLET ORAL DAILY
COMMUNITY

## 2019-11-19 ASSESSMENT — ENCOUNTER SYMPTOMS
SORE THROAT: 0
NAUSEA: 0
ABDOMINAL PAIN: 0
SHORTNESS OF BREATH: 0
COUGH: 0

## 2019-11-19 ASSESSMENT — PATIENT HEALTH QUESTIONNAIRE - PHQ9
1. LITTLE INTEREST OR PLEASURE IN DOING THINGS: 0
SUM OF ALL RESPONSES TO PHQ QUESTIONS 1-9: 0
2. FEELING DOWN, DEPRESSED OR HOPELESS: 0
SUM OF ALL RESPONSES TO PHQ9 QUESTIONS 1 & 2: 0
SUM OF ALL RESPONSES TO PHQ QUESTIONS 1-9: 0

## 2019-11-21 ENCOUNTER — PROCEDURE VISIT (OUTPATIENT)
Dept: SURGERY | Age: 69
End: 2019-11-21
Payer: MEDICARE

## 2019-11-21 VITALS
OXYGEN SATURATION: 94 % | WEIGHT: 229 LBS | SYSTOLIC BLOOD PRESSURE: 161 MMHG | TEMPERATURE: 98.4 F | HEART RATE: 63 BPM | BODY MASS INDEX: 33.82 KG/M2 | DIASTOLIC BLOOD PRESSURE: 83 MMHG

## 2019-11-21 DIAGNOSIS — C44.319 BASAL CELL CARCINOMA OF FOREHEAD: Primary | ICD-10-CM

## 2019-11-21 PROCEDURE — 13132 CMPLX RPR F/C/C/M/N/AX/G/H/F: CPT | Performed by: DERMATOLOGY

## 2019-11-21 PROCEDURE — 17311 MOHS 1 STAGE H/N/HF/G: CPT | Performed by: DERMATOLOGY

## 2019-11-21 RX ORDER — RIBOFLAVIN (VITAMIN B2) 100 MG
100 TABLET ORAL DAILY
COMMUNITY

## 2019-11-22 ENCOUNTER — TELEPHONE (OUTPATIENT)
Dept: SURGERY | Age: 69
End: 2019-11-22

## 2019-12-02 ENCOUNTER — TELEPHONE (OUTPATIENT)
Dept: PRIMARY CARE CLINIC | Age: 69
End: 2019-12-02

## 2019-12-02 ENCOUNTER — NURSE ONLY (OUTPATIENT)
Dept: PRIMARY CARE CLINIC | Age: 69
End: 2019-12-02

## 2019-12-02 VITALS — DIASTOLIC BLOOD PRESSURE: 80 MMHG | HEART RATE: 73 BPM | SYSTOLIC BLOOD PRESSURE: 160 MMHG

## 2019-12-02 DIAGNOSIS — I10 ESSENTIAL HYPERTENSION: ICD-10-CM

## 2019-12-02 DIAGNOSIS — I10 ESSENTIAL HYPERTENSION: Primary | ICD-10-CM

## 2019-12-02 RX ORDER — CARVEDILOL 6.25 MG/1
12.5 TABLET ORAL 2 TIMES DAILY
Qty: 180 TABLET | Refills: 1
Start: 2019-12-02 | End: 2020-01-13 | Stop reason: SDUPTHER

## 2019-12-02 RX ORDER — CARVEDILOL 12.5 MG/1
12.5 TABLET ORAL 2 TIMES DAILY
Qty: 60 TABLET | Refills: 0 | Status: CANCELLED | OUTPATIENT
Start: 2019-12-02

## 2019-12-02 RX ORDER — LISINOPRIL 20 MG/1
20 TABLET ORAL DAILY
Qty: 90 TABLET | Refills: 1
Start: 2019-12-02 | End: 2019-12-30 | Stop reason: SDUPTHER

## 2019-12-30 ENCOUNTER — NURSE ONLY (OUTPATIENT)
Dept: PRIMARY CARE CLINIC | Age: 69
End: 2019-12-30

## 2019-12-30 ENCOUNTER — TELEPHONE (OUTPATIENT)
Dept: PRIMARY CARE CLINIC | Age: 69
End: 2019-12-30

## 2019-12-30 VITALS — SYSTOLIC BLOOD PRESSURE: 150 MMHG | DIASTOLIC BLOOD PRESSURE: 83 MMHG | HEART RATE: 61 BPM

## 2019-12-30 DIAGNOSIS — I10 ESSENTIAL HYPERTENSION: ICD-10-CM

## 2019-12-30 RX ORDER — LISINOPRIL 20 MG/1
40 TABLET ORAL DAILY
Qty: 90 TABLET | Refills: 1
Start: 2019-12-30 | End: 2020-01-13 | Stop reason: SDUPTHER

## 2020-01-13 RX ORDER — LISINOPRIL 20 MG/1
40 TABLET ORAL DAILY
Qty: 90 TABLET | Refills: 1 | Status: CANCELLED | OUTPATIENT
Start: 2020-01-13 | End: 2020-04-12

## 2020-01-13 RX ORDER — LISINOPRIL 40 MG/1
40 TABLET ORAL DAILY
Qty: 90 TABLET | Refills: 1 | Status: SHIPPED | OUTPATIENT
Start: 2020-01-13 | End: 2020-04-06 | Stop reason: SDUPTHER

## 2020-01-13 RX ORDER — CARVEDILOL 6.25 MG/1
12.5 TABLET ORAL 2 TIMES DAILY
Qty: 180 TABLET | Refills: 1 | Status: CANCELLED | OUTPATIENT
Start: 2020-01-13 | End: 2020-04-12

## 2020-01-13 RX ORDER — CARVEDILOL 12.5 MG/1
12.5 TABLET ORAL 2 TIMES DAILY
Qty: 180 TABLET | Refills: 1 | Status: SHIPPED | OUTPATIENT
Start: 2020-01-13 | End: 2020-04-06 | Stop reason: SDUPTHER

## 2020-01-13 NOTE — TELEPHONE ENCOUNTER
Per last note from BP check, patient is supposed to be on    1) carvedilol 6.25 mg BID  2) lisinopril 20mg QD    Rx being requested is for carvedilol 6.25mg 2 tabs twice a day and for lisinopril 20mg 2 tabs once a day. This is conflicting and therefore will send to PCP to verify what dose he would like patient to be on. He will review in the next hour from home.

## 2020-01-21 ENCOUNTER — OFFICE VISIT (OUTPATIENT)
Dept: DERMATOLOGY | Age: 70
End: 2020-01-21
Payer: MEDICARE

## 2020-01-21 PROBLEM — L82.1 SEBORRHEIC KERATOSIS: Status: ACTIVE | Noted: 2020-01-21

## 2020-01-21 PROBLEM — D22.9 MULTIPLE BENIGN MELANOCYTIC NEVI: Status: ACTIVE | Noted: 2020-01-21

## 2020-01-21 PROBLEM — Z12.83 SKIN CANCER SCREENING: Status: ACTIVE | Noted: 2020-01-21

## 2020-01-21 PROCEDURE — G8417 CALC BMI ABV UP PARAM F/U: HCPCS | Performed by: DERMATOLOGY

## 2020-01-21 PROCEDURE — 1123F ACP DISCUSS/DSCN MKR DOCD: CPT | Performed by: DERMATOLOGY

## 2020-01-21 PROCEDURE — G8427 DOCREV CUR MEDS BY ELIG CLIN: HCPCS | Performed by: DERMATOLOGY

## 2020-01-21 PROCEDURE — 4040F PNEUMOC VAC/ADMIN/RCVD: CPT | Performed by: DERMATOLOGY

## 2020-01-21 PROCEDURE — G8482 FLU IMMUNIZE ORDER/ADMIN: HCPCS | Performed by: DERMATOLOGY

## 2020-01-21 PROCEDURE — 99213 OFFICE O/P EST LOW 20 MIN: CPT | Performed by: DERMATOLOGY

## 2020-01-21 PROCEDURE — 1036F TOBACCO NON-USER: CPT | Performed by: DERMATOLOGY

## 2020-01-21 PROCEDURE — 3017F COLORECTAL CA SCREEN DOC REV: CPT | Performed by: DERMATOLOGY

## 2020-01-21 NOTE — PROGRESS NOTES
additional lesions    Heme: No history of bleeding diatheses    Allergy: Denies seasonal or environmental allergies or other medication allergies     PHYSICAL EXAM:    General: Well-appearing, NAD    Neurologic/psychiatric: Patient is AOx3; mood and affect are appropriate and congruent  Eyes: conjunctivae and eyelids without erythema, injection, or discharge   Integument: Examination was performed of the following and were unremarkable except as otherwise noted below:psych/neuro, scalp, hair, face, ears, conjunctivae/eyelids, gums/teeth/lips, buccal mucosa, oropharynx, neck, breast/axilla/chest, abdomen, groin, back, buttocks, RUE, LUE, RLE, LLE, digits/nails. Genital exam deferred per patient. Abnormalities noted include:    1.) Central forehead with a few mm linear scar  2.) Torso and extremities with several variably sized scattered brown to tan round smooth melanocytic macules and papules  3.) Torso and extremities with several scattered tan to skin colored ovoid scaly stuck on thin papules and small plaques      ASSESSMENT AND PLAN:    1.)  History of BCC, clear today:  - Reassurance  - FBSE q yearly    2.) Multiple scattered acquired melanocytic nevi with banal features:   - Reassurance  - Edu: patient regarding sun protection strategies, including use of broad spectrum sunscreen with SPF of at least 30, sun guard clothing, broad brimmed hat, sunglasses, and sun avoidance during peak hours of the day. ABCDE's of melanoma also reviewed    3.) SKs:  - Benign, reassurance; no tx necessary unless symptomatic or for cosmesis      Return to Clinic: 1 yr and prn changes  Discussed plan with patient and/or primary caretaker. Patient to call clinic with any questions / concerns. Reviewed side effects of treatment(s) and/or medication(s) with patient and/or primary caretaker.    AVS provided or is available on [employer]   ____________________________________________________________________________

## 2020-01-22 RX ORDER — LISINOPRIL 10 MG/1
TABLET ORAL
Qty: 90 TABLET | Refills: 0 | OUTPATIENT
Start: 2020-01-22

## 2020-02-20 PROBLEM — Z12.83 SKIN CANCER SCREENING: Status: RESOLVED | Noted: 2020-01-21 | Resolved: 2020-02-20

## 2020-04-07 RX ORDER — LISINOPRIL 40 MG/1
40 TABLET ORAL DAILY
Qty: 90 TABLET | Refills: 1 | Status: SHIPPED | OUTPATIENT
Start: 2020-04-07 | End: 2020-11-11 | Stop reason: SDUPTHER

## 2020-04-07 RX ORDER — CARVEDILOL 12.5 MG/1
12.5 TABLET ORAL 2 TIMES DAILY
Qty: 180 TABLET | Refills: 1 | Status: SHIPPED | OUTPATIENT
Start: 2020-04-07 | End: 2020-11-11 | Stop reason: SDUPTHER

## 2020-04-15 ENCOUNTER — VIRTUAL VISIT (OUTPATIENT)
Dept: PRIMARY CARE CLINIC | Age: 70
End: 2020-04-15
Payer: MEDICARE

## 2020-04-15 VITALS — HEART RATE: 68 BPM | DIASTOLIC BLOOD PRESSURE: 85 MMHG | SYSTOLIC BLOOD PRESSURE: 160 MMHG

## 2020-04-15 PROBLEM — Z85.828 HISTORY OF BASAL CELL CARCINOMA: Status: ACTIVE | Noted: 2020-04-15

## 2020-04-15 PROCEDURE — G8427 DOCREV CUR MEDS BY ELIG CLIN: HCPCS | Performed by: FAMILY MEDICINE

## 2020-04-15 PROCEDURE — 3017F COLORECTAL CA SCREEN DOC REV: CPT | Performed by: FAMILY MEDICINE

## 2020-04-15 PROCEDURE — 1123F ACP DISCUSS/DSCN MKR DOCD: CPT | Performed by: FAMILY MEDICINE

## 2020-04-15 PROCEDURE — 99213 OFFICE O/P EST LOW 20 MIN: CPT | Performed by: FAMILY MEDICINE

## 2020-04-15 PROCEDURE — 4040F PNEUMOC VAC/ADMIN/RCVD: CPT | Performed by: FAMILY MEDICINE

## 2020-04-15 RX ORDER — AMLODIPINE BESYLATE 10 MG/1
10 TABLET ORAL DAILY
Qty: 90 TABLET | Refills: 1 | Status: SHIPPED | OUTPATIENT
Start: 2020-04-15 | End: 2020-11-11 | Stop reason: SDUPTHER

## 2020-04-15 ASSESSMENT — ENCOUNTER SYMPTOMS
SHORTNESS OF BREATH: 0
SORE THROAT: 0
NAUSEA: 0
ABDOMINAL PAIN: 0
COUGH: 0

## 2020-04-15 NOTE — PROGRESS NOTES
Systems   Constitutional: Negative for fever. HENT: Negative for ear pain and sore throat. Respiratory: Negative for cough and shortness of breath. Cardiovascular: Negative for chest pain. Gastrointestinal: Negative for abdominal pain and nausea. Endocrine: Negative for polydipsia and polyuria. Skin: Negative for rash. Neurological: Negative for dizziness and headaches. Hematological: Negative for adenopathy. Vitals unable to obtain 2/2 virtual visit nature of this encounter. Physical Exam  Constitutional:       Appearance: Normal appearance. He is obese. He is not toxic-appearing. HENT:      Head: Normocephalic and atraumatic. Eyes:      Extraocular Movements: Extraocular movements intact. Conjunctiva/sclera: Conjunctivae normal.   Pulmonary:      Effort: Pulmonary effort is normal.   Neurological:      General: No focal deficit present. Mental Status: He is alert and oriented to person, place, and time. Psychiatric:         Mood and Affect: Mood normal.         Thought Content:  Thought content normal.         Lab Results   Component Value Date    WBC 9.2 11/27/2017    HGB 11.6 (L) 11/27/2017    HCT 37.7 (L) 11/27/2017    MCV 86.6 11/27/2017     11/27/2017     Lab Results   Component Value Date     04/29/2019    K 5.0 04/29/2019     04/29/2019    CO2 26 04/29/2019    BUN 9 04/29/2019    CREATININE 0.9 04/29/2019    GLUCOSE 129 (H) 04/29/2019    CALCIUM 10.0 04/29/2019    PROT 5.3 (L) 10/15/2017    LABALBU 2.3 (L) 10/24/2017    BILITOT 1.0 10/15/2017    ALKPHOS 77 10/15/2017    AST 80 (H) 10/15/2017    ALT 54 (H) 10/15/2017    LABGLOM >60 04/29/2019    GFRAA >60 04/29/2019     Lab Results   Component Value Date    LABA1C 6.1 11/19/2019       Lab Results   Component Value Date    CHOL 186 11/27/2017     Lab Results   Component Value Date    TRIG 114 11/27/2017     Lab Results   Component Value Date    HDL 44 04/29/2019    HDL 45 11/27/2017     Lab Results

## 2020-06-10 ENCOUNTER — PATIENT MESSAGE (OUTPATIENT)
Dept: PRIMARY CARE CLINIC | Age: 70
End: 2020-06-10

## 2020-10-09 RX ORDER — AMLODIPINE BESYLATE 10 MG/1
TABLET ORAL
Qty: 90 TABLET | Refills: 1 | OUTPATIENT
Start: 2020-10-09

## 2020-10-09 RX ORDER — LISINOPRIL 40 MG/1
TABLET ORAL
Qty: 90 TABLET | Refills: 1 | OUTPATIENT
Start: 2020-10-09

## 2020-10-09 RX ORDER — CARVEDILOL 12.5 MG/1
TABLET ORAL
Qty: 180 TABLET | Refills: 1 | OUTPATIENT
Start: 2020-10-09

## 2020-11-11 ENCOUNTER — OFFICE VISIT (OUTPATIENT)
Dept: PRIMARY CARE CLINIC | Age: 70
End: 2020-11-11
Payer: MEDICARE

## 2020-11-11 VITALS
OXYGEN SATURATION: 97 % | BODY MASS INDEX: 33.03 KG/M2 | SYSTOLIC BLOOD PRESSURE: 170 MMHG | TEMPERATURE: 98.6 F | WEIGHT: 223 LBS | HEART RATE: 94 BPM | DIASTOLIC BLOOD PRESSURE: 74 MMHG | HEIGHT: 69 IN

## 2020-11-11 PROBLEM — Z87.39 HISTORY OF GOUT: Status: ACTIVE | Noted: 2020-11-11

## 2020-11-11 PROCEDURE — G8926 SPIRO NO PERF OR DOC: HCPCS | Performed by: FAMILY MEDICINE

## 2020-11-11 PROCEDURE — G0008 ADMIN INFLUENZA VIRUS VAC: HCPCS | Performed by: FAMILY MEDICINE

## 2020-11-11 PROCEDURE — 4040F PNEUMOC VAC/ADMIN/RCVD: CPT | Performed by: FAMILY MEDICINE

## 2020-11-11 PROCEDURE — 3023F SPIROM DOC REV: CPT | Performed by: FAMILY MEDICINE

## 2020-11-11 PROCEDURE — 3017F COLORECTAL CA SCREEN DOC REV: CPT | Performed by: FAMILY MEDICINE

## 2020-11-11 PROCEDURE — 99214 OFFICE O/P EST MOD 30 MIN: CPT | Performed by: FAMILY MEDICINE

## 2020-11-11 PROCEDURE — G8417 CALC BMI ABV UP PARAM F/U: HCPCS | Performed by: FAMILY MEDICINE

## 2020-11-11 PROCEDURE — 1036F TOBACCO NON-USER: CPT | Performed by: FAMILY MEDICINE

## 2020-11-11 PROCEDURE — 1123F ACP DISCUSS/DSCN MKR DOCD: CPT | Performed by: FAMILY MEDICINE

## 2020-11-11 PROCEDURE — G8427 DOCREV CUR MEDS BY ELIG CLIN: HCPCS | Performed by: FAMILY MEDICINE

## 2020-11-11 PROCEDURE — G8484 FLU IMMUNIZE NO ADMIN: HCPCS | Performed by: FAMILY MEDICINE

## 2020-11-11 PROCEDURE — 90694 VACC AIIV4 NO PRSRV 0.5ML IM: CPT | Performed by: FAMILY MEDICINE

## 2020-11-11 RX ORDER — AMLODIPINE BESYLATE 10 MG/1
10 TABLET ORAL DAILY
Qty: 90 TABLET | Refills: 1 | Status: SHIPPED | OUTPATIENT
Start: 2020-11-11 | End: 2021-05-13

## 2020-11-11 RX ORDER — PREDNISONE 20 MG/1
40 TABLET ORAL DAILY
Qty: 14 TABLET | Refills: 0 | Status: SHIPPED | OUTPATIENT
Start: 2020-11-11 | End: 2020-11-18

## 2020-11-11 RX ORDER — LISINOPRIL 40 MG/1
40 TABLET ORAL DAILY
Qty: 90 TABLET | Refills: 1 | Status: SHIPPED | OUTPATIENT
Start: 2020-11-11 | End: 2021-05-13

## 2020-11-11 RX ORDER — CARVEDILOL 12.5 MG/1
12.5 TABLET ORAL 2 TIMES DAILY
Qty: 180 TABLET | Refills: 1 | Status: SHIPPED | OUTPATIENT
Start: 2020-11-11 | End: 2021-05-13

## 2020-11-11 RX ORDER — ALBUTEROL SULFATE 90 UG/1
2 AEROSOL, METERED RESPIRATORY (INHALATION) EVERY 6 HOURS PRN
Qty: 1 INHALER | Refills: 3 | Status: SHIPPED | OUTPATIENT
Start: 2020-11-11

## 2020-11-11 ASSESSMENT — PATIENT HEALTH QUESTIONNAIRE - PHQ9
2. FEELING DOWN, DEPRESSED OR HOPELESS: 0
1. LITTLE INTEREST OR PLEASURE IN DOING THINGS: 0
SUM OF ALL RESPONSES TO PHQ QUESTIONS 1-9: 0
SUM OF ALL RESPONSES TO PHQ9 QUESTIONS 1 & 2: 0
SUM OF ALL RESPONSES TO PHQ QUESTIONS 1-9: 0
SUM OF ALL RESPONSES TO PHQ QUESTIONS 1-9: 0

## 2020-11-11 NOTE — PATIENT INSTRUCTIONS
Patient Education        Gout: Care Instructions  Your Care Instructions     Gout is a form of arthritis caused by a buildup of uric acid crystals in a joint. It causes sudden attacks of pain, swelling, redness, and stiffness, usually in one joint, especially the big toe. Gout usually comes on without a cause. But it can be brought on by drinking alcohol (especially beer) or eating seafood and red meat. Taking certain medicines, such as diuretics or aspirin, also can bring on an attack of gout. Taking your medicines as prescribed and following up with your doctor regularly can help you avoid gout attacks in the future. Follow-up care is a key part of your treatment and safety. Be sure to make and go to all appointments, and call your doctor if you are having problems. It's also a good idea to know your test results and keep a list of the medicines you take. How can you care for yourself at home? · If the joint is swollen, put ice or a cold pack on the area for 10 to 20 minutes at a time. Put a thin cloth between the ice and your skin. · Prop up the sore limb on a pillow when you ice it or anytime you sit or lie down during the next 3 days. Try to keep it above the level of your heart. This will help reduce swelling. · Rest sore joints. Avoid activities that put weight or strain on the joints for a few days. Take short rest breaks from your regular activities during the day. · Take your medicines exactly as prescribed. Call your doctor if you think you are having a problem with your medicine. · Take pain medicines exactly as directed. ? If the doctor gave you a prescription medicine for pain, take it as prescribed. ? If you are not taking a prescription pain medicine, ask your doctor if you can take an over-the-counter medicine. · Eat less seafood and red meat. · Check with your doctor before drinking alcohol. · Losing weight, if you are overweight, may help reduce attacks of gout.  But do not go on a \"crash diet. \" Losing a lot of weight in a short amount of time can cause a gout attack. When should you call for help? Call your doctor now or seek immediate medical care if:    · You have a fever.     · The joint is so painful you cannot use it.     · You have sudden, unexplained swelling, redness, warmth, or severe pain in one or more joints. Watch closely for changes in your health, and be sure to contact your doctor if:    · You have joint pain.     · Your symptoms get worse or are not improving after 2 or 3 days. Where can you learn more? Go to https://Pollen - Social Platformpepiceweb.Liquidnet. org and sign in to your Webbyn[employer] account. Enter W926 in the [employer] box to learn more about \"Gout: Care Instructions. \"     If you do not have an account, please click on the \"Sign Up Now\" link. Current as of: December 9, 2019               Content Version: 12.6  © 7319-2927 [employer]. Care instructions adapted under license by [facility] ([facility] If you have questions about a medical condition or this instruction, always ask your healthcare professional. [place] [address] any warranty or liability for your use of this information. Patient Education        Purine-Restricted Diet: Care Instructions  Your Care Instructions     Purines are substances that are found in some foods. Your body turns purines into uric acid. High levels of uric acid can cause gout, which is a form of arthritis that causes pain and inflammation in joints. You may be able to help control the amount of uric acid in your body by limiting high-purine foods in your diet. Follow-up care is a key part of your treatment and safety. Be sure to make and go to all appointments, and call your doctor if you are having problems. It's also a good idea to know your test results and keep a list of the medicines you take. How can you care for yourself at home?   · Plan your meals and snacks around foods that are low in purines and are safe for you to eat. These foods include:  ? Green vegetables and tomatoes. ? Fruits. ? Whole-grain breads, rice, and cereals. ? Eggs, peanut butter, and nuts. ? Low-fat milk, cheese, and other milk products. ? Popcorn. ? Gelatin desserts, chocolate, cocoa, and cakes and sweets, in small amounts. · You can eat certain foods that are medium-high in purines, but eat them only once in a while. These foods include:  ? Legumes, such as dried beans and dried peas. You can have 1 cup cooked legumes each day. ? Asparagus, cauliflower, spinach, mushrooms, and green peas. ? Fish and seafood (other than very high-purine seafood). ? Oatmeal, wheat bran, and wheat germ. · Limit very high-purine foods, including:  ? Organ meats, such as liver, kidneys, sweetbreads, and brains. ? Meats, including chris, beef, pork, and lamb. ? Game meats and any other meats in large amounts. ? Anchovies, sardines, herring, mackerel, and scallops. ? Gravy. ? Beer. Where can you learn more? Go to https://[employer].Syllabuster. org and sign in to your [employer] account. Enter F448 in the [facility] box to learn more about \"Purine-Restricted Diet: Care Instructions. \"     If you do not have an account, please click on the \"Sign Up Now\" link. Current as of: August 22, 2019               Content Version: 12.6  © 8728-2145 [employer]. Care instructions adapted under license by [facility] [place] ([facility]). If you have questions about a medical condition or this instruction, always ask your healthcare professional. [address] any warranty or liability for your use of this information.

## 2020-11-11 NOTE — PROGRESS NOTES
Vaccine Information Sheet, \"Influenza - Inactivated\"  given to Kwesi Salinas, or parent/legal guardian of  Kwesi Salinas and verbalized understanding. Patient responses:    Have you ever had a reaction to a flu vaccine? No  Do you have any current illness? No  Have you ever had Guillian Assaria Syndrome? No  Do you have a serious allergy to any of the follow: Neomycin, Polymyxin, Thimerosal, eggs or egg products? No    Flu vaccine given per order. Please see immunization tab. Risks and benefits explained. Current VIS given.

## 2020-11-11 NOTE — PROGRESS NOTES
[address] PRIMARY CARE  [address] [place] [id number]  Dept: [phone]  Dept Fax: [phone]     11/11/2020      Mario Albertomiguel Jer   1950     Chief Complaint   Patient presents with    Medication Refill     thinks gout in left foot       HPI  Pt comes in today for follow up. Has been out of all 3 BP meds for at least 1 week. Reports Bps normal while on meds. Only other concern today:    L FOOT: Thinks he has a gout attack in this foot - pain started 1 week ago without injury. Feeling of pain in ankle joint and midfoot. Felt like there was some swelling. Had worsening pain with ambulating. Started taking some Tylenol. Feeling better now - abut 40% improved now. PHQ Scores 11/11/2020 11/19/2019 10/23/2018 11/27/2017   PHQ2 Score 0 0 0 0   PHQ9 Score 0 0 0 0     Interpretation of Total Score Depression Severity: 1-4 = Minimal depression, 5-9 = Mild depression, 10-14 = Moderate depression, 15-19 = Moderately severe depression, 20-27 = Severe depression     Prior to Visit Medications    Medication Sig Taking?  Authorizing Provider   THIAMINE HCL PO Take by mouth Yes Historical Provider, MD   Ascorbic Acid (VITAMIN C) 100 MG tablet Take 100 mg by mouth daily Yes Historical Provider, MD   aspirin 325 MG tablet Take 325 mg by mouth daily Yes Historical Provider, MD   VITAMIN D, CHOLECALCIFEROL, PO Take by mouth daily Yes Historical Provider, MD   acetaminophen (TYLENOL) 500 MG tablet Take 1,000 mg by mouth every 6 hours as needed Yes Historical Provider, MD   Omega-3 Fatty Acids (FISH OIL) 1000 MG CAPS Take 3,000 mg by mouth daily Yes Historical Provider, MD   B Complex Vitamins (B COMPLEX PO) Take by mouth Yes Historical Provider, MD   Multiple Vitamins-Minerals (MULTIVITAMIN ADULTS PO) Take by mouth Yes Historical Provider, MD   albuterol sulfate HFA (PROAIR HFA) 108 (90 Base) MCG/ACT inhaler Inhale 2 puffs into the lungs every 6 hours as needed for Wheezing Yes Lavon Gudino APRN - CNP   folic acid (FOLVITE) 1 MG tablet Take 1 tablet by mouth daily Yes Judy Pack MD   amLODIPine (NORVASC) 10 MG tablet Take 1 tablet by mouth daily  Kamran Grammes, DO   carvedilol (COREG) 12.5 MG tablet Take 1 tablet by mouth 2 times daily  Kamran Grammes, DO   lisinopril (PRINIVIL;ZESTRIL) 40 MG tablet Take 1 tablet by mouth daily  Kamran Grammes, DO       Past Medical History:   Diagnosis Date    COPD (chronic obstructive pulmonary disease) ([facility] [address]     Full dentures     History of basal cell carcinoma     History of blood transfusion     History of gout 2020    Hx of blood clots     Arterial blockage in RLE    Hyperlipidemia     Hypertension     Primary osteoarthritis of right knee         Social History     Tobacco Use    Smoking status: Former Smoker     Packs/day: 1.50     Years: 20.00     Pack years: 30.00     Types: Cigarettes     Last attempt to quit: 1994     Years since quittin.0    Smokeless tobacco: Never Used   Substance Use Topics    Alcohol use: Yes     Comment: Few times per week - beer    Drug use: No        Past Surgical History:   Procedure Laterality Date    ANGIOPLASTY      RLE    CAROTID STENT PLACEMENT  2016    [facility]    Inferior forehead    UPPER GASTROINTESTINAL ENDOSCOPY  10/17/2017    Dr. 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 Osmany Crockett - few tiny antral erosions, biopsies done for H pylori, large varices in cardia        Allergies   Allergen Reactions    Pcn [Penicillins] Hives        Family History   Problem Relation Age of Onset    No Known Problems Mother     High Blood Pressure Father         Patient's past medical history, surgical history, family history, medications, and allergies  were all reviewed and updated as appropriate today.     Review of Systems   Constitutional: Negative for fatigue, fever and unexpected weight change. HENT: Negative for congestion, ear pain and sore throat. Eyes: Negative for pain, itching and visual disturbance. Respiratory: Negative for cough, shortness of breath and wheezing. Cardiovascular: Negative for chest pain, palpitations and leg swelling. Gastrointestinal: Negative for abdominal pain, constipation, diarrhea, nausea and vomiting. Endocrine: Negative for cold intolerance, heat intolerance, polydipsia and polyuria. Genitourinary: Negative for dysuria, frequency and hematuria. Musculoskeletal: Positive for arthralgias (L ankle) and joint swelling (L ankle). Skin: Negative for rash. Neurological: Negative for dizziness and headaches. BP (!) 170/74   Pulse 94   Temp 98.6 °F (37 °C)   Ht 5' 9\" (1.753 m)   Wt 223 lb (101.2 kg)   SpO2 97%   BMI 32.93 kg/m²      Physical Exam  Vitals signs reviewed. Constitutional:       General: He is not in acute distress. Appearance: Normal appearance. He is well-developed and normal weight. HENT:      Head: Normocephalic and atraumatic. Right Ear: Tympanic membrane and ear canal normal. No drainage. No middle ear effusion. Tympanic membrane is not erythematous. Left Ear: Tympanic membrane and ear canal normal. No drainage. No middle ear effusion. Tympanic membrane is not erythematous. Nose: Nose normal. No rhinorrhea. Mouth/Throat:      Mouth: Mucous membranes are moist.      Pharynx: No oropharyngeal exudate or posterior oropharyngeal erythema. Eyes:      Extraocular Movements: Extraocular movements intact. Pupils: Pupils are equal, round, and reactive to light. Neck:      Musculoskeletal: Neck supple. Thyroid: No thyromegaly. Cardiovascular:      Rate and Rhythm: Normal rate and regular rhythm. Heart sounds: No murmur. Pulmonary:      Effort: Pulmonary effort is normal.      Breath sounds: Normal breath sounds. No wheezing. Abdominal:      General: Bowel sounds are normal.      Palpations: Abdomen is soft. There is no mass. Tenderness: There is no abdominal tenderness. Musculoskeletal:      Left ankle: He exhibits swelling (mild). He exhibits normal range of motion and no deformity. Lymphadenopathy:      Cervical: No cervical adenopathy. Skin:     General: Skin is warm and dry. Findings: No rash. Neurological:      General: No focal deficit present. Mental Status: He is alert and oriented to person, place, and time. Cranial Nerves: No cranial nerve deficit. Psychiatric:         Mood and Affect: Mood normal.         Assessment:  Encounter Diagnoses   Name Primary?  Essential hypertension Yes    Acute gout of left ankle, unspecified cause     History of gout     COPD, mild (HCC)     [facility]     Borderline diabetes     Flu vaccine need        Plan:  1. Essential hypertension  Chronic, uncontrolled. Has been out of meds. Resume these - refills given. F/U short term at wellness in Jan.  - carvedilol (COREG) 12.5 MG tablet; Take 1 tablet by mouth 2 times daily  Dispense: 180 tablet; Refill: 1  - lisinopril (PRINIVIL;ZESTRIL) 40 MG tablet; Take 1 tablet by mouth daily  Dispense: 90 tablet; Refill: 1  - amLODIPine (NORVASC) 10 MG tablet; Take 1 tablet by mouth daily  Dispense: 90 tablet; Refill: 1    2. Acute gout of left ankle, unspecified cause  Acute L ankle/foot pain suspicious for gouty attack. Will RX prednisone burst. Counseled on other treatments and monitoring for resolution. This would zahra second gout attack in a couple of years. Provided pt education for this now. - predniSONE (DELTASONE) 20 MG tablet; Take 2 tablets by mouth daily for 7 days  Dispense: 14 tablet; Refill: 0    3. History of gout    4. COPD, mild (HCC)  Chronic, stable. Refill albuterol given. - albuterol sulfate HFA (PROAIR HFA) 108 (90 Base) MCG/ACT inhaler;  Inhale 2 puffs into the lungs every 6 hours as needed for Wheezing  Dispense: 1 Inhaler; Refill: 3    5. PAD (peripheral artery disease) (HCC)  Stable. 6. Borderline diabetes  Plan to monitor at wellness. 7. Flu vaccine need  Given today, see nurse notes. - INFLUENZA, QUADV, ADJUVANTED, 65 YRS =, IM, PF, PREFILL SYR, 0.5ML (FLUAD)      Return in about 6 weeks (around 12/23/2020) for [facility]. Kailash Heart, DO     Please note that this chart was generated using dragon dictation software. Although every effort was made to ensure the accuracy of this automated transcription, some errors in transcription may have occurred.

## 2020-11-12 ASSESSMENT — ENCOUNTER SYMPTOMS
DIARRHEA: 0
SHORTNESS OF BREATH: 0
EYE ITCHING: 0
COUGH: 0
NAUSEA: 0
EYE PAIN: 0
ABDOMINAL PAIN: 0
WHEEZING: 0
SORE THROAT: 0
CONSTIPATION: 0
VOMITING: 0

## 2021-02-10 ENCOUNTER — PATIENT MESSAGE (OUTPATIENT)
Dept: PRIMARY CARE CLINIC | Age: 71
End: 2021-02-10

## 2021-02-11 DIAGNOSIS — I10 ESSENTIAL HYPERTENSION: ICD-10-CM

## 2021-02-11 RX ORDER — AMLODIPINE BESYLATE 10 MG/1
10 TABLET ORAL DAILY
Qty: 90 TABLET | Refills: 1 | Status: CANCELLED | OUTPATIENT
Start: 2021-02-11 | End: 2021-05-12

## 2021-02-11 RX ORDER — LISINOPRIL 40 MG/1
40 TABLET ORAL DAILY
Qty: 90 TABLET | Refills: 1 | Status: CANCELLED | OUTPATIENT
Start: 2021-02-11 | End: 2021-05-12

## 2021-02-11 RX ORDER — CARVEDILOL 12.5 MG/1
12.5 TABLET ORAL 2 TIMES DAILY
Qty: 180 TABLET | Refills: 1 | Status: CANCELLED | OUTPATIENT
Start: 2021-02-11 | End: 2021-05-12

## 2021-02-11 NOTE — TELEPHONE ENCOUNTER
From: Chirag Bruno  To: Anna Merida,   Sent: 2/10/2021 6:53 PM EST  Subject: Non-Urgent Medical Question    do you know when i can get an appoitment for the covid shot and how i can set it up ??

## 2021-06-14 ENCOUNTER — OFFICE VISIT (OUTPATIENT)
Dept: PRIMARY CARE CLINIC | Age: 71
End: 2021-06-14
Payer: MEDICARE

## 2021-06-14 VITALS
BODY MASS INDEX: 31.21 KG/M2 | DIASTOLIC BLOOD PRESSURE: 75 MMHG | HEIGHT: 70 IN | OXYGEN SATURATION: 98 % | WEIGHT: 218 LBS | TEMPERATURE: 98.2 F | HEART RATE: 74 BPM | SYSTOLIC BLOOD PRESSURE: 142 MMHG

## 2021-06-14 DIAGNOSIS — R73.03 BORDERLINE DIABETES: ICD-10-CM

## 2021-06-14 DIAGNOSIS — Z00.00 ROUTINE GENERAL MEDICAL EXAMINATION AT A HEALTH CARE FACILITY: ICD-10-CM

## 2021-06-14 DIAGNOSIS — I10 ESSENTIAL HYPERTENSION: ICD-10-CM

## 2021-06-14 DIAGNOSIS — Z85.828 HISTORY OF BASAL CELL CARCINOMA: ICD-10-CM

## 2021-06-14 DIAGNOSIS — Z12.11 SCREENING FOR COLON CANCER: ICD-10-CM

## 2021-06-14 DIAGNOSIS — Z12.5 SCREENING FOR PROSTATE CANCER: ICD-10-CM

## 2021-06-14 DIAGNOSIS — Z00.00 ROUTINE GENERAL MEDICAL EXAMINATION AT A HEALTH CARE FACILITY: Primary | ICD-10-CM

## 2021-06-14 LAB
A/G RATIO: 1.4 (ref 1.1–2.2)
ALBUMIN SERPL-MCNC: 4.5 G/DL (ref 3.4–5)
ALP BLD-CCNC: 97 U/L (ref 40–129)
ALT SERPL-CCNC: 64 U/L (ref 10–40)
ANION GAP SERPL CALCULATED.3IONS-SCNC: 14 MMOL/L (ref 3–16)
AST SERPL-CCNC: 65 U/L (ref 15–37)
BASOPHILS ABSOLUTE: 0.1 K/UL (ref 0–0.2)
BASOPHILS RELATIVE PERCENT: 1 %
BILIRUB SERPL-MCNC: 1.3 MG/DL (ref 0–1)
BUN BLDV-MCNC: 10 MG/DL (ref 7–20)
CALCIUM SERPL-MCNC: 9.6 MG/DL (ref 8.3–10.6)
CHLORIDE BLD-SCNC: 102 MMOL/L (ref 99–110)
CHOLESTEROL, FASTING: 201 MG/DL (ref 0–199)
CO2: 23 MMOL/L (ref 21–32)
CREAT SERPL-MCNC: 0.8 MG/DL (ref 0.8–1.3)
EOSINOPHILS ABSOLUTE: 0.2 K/UL (ref 0–0.6)
EOSINOPHILS RELATIVE PERCENT: 3.7 %
GFR AFRICAN AMERICAN: >60
GFR NON-AFRICAN AMERICAN: >60
GLOBULIN: 3.3 G/DL
GLUCOSE FASTING: 133 MG/DL (ref 70–99)
HCT VFR BLD CALC: 45.1 % (ref 40.5–52.5)
HDLC SERPL-MCNC: 64 MG/DL (ref 40–60)
HEMOGLOBIN: 15.6 G/DL (ref 13.5–17.5)
LDL CHOLESTEROL CALCULATED: 121 MG/DL
LYMPHOCYTES ABSOLUTE: 1.5 K/UL (ref 1–5.1)
LYMPHOCYTES RELATIVE PERCENT: 23.8 %
MCH RBC QN AUTO: 34.3 PG (ref 26–34)
MCHC RBC AUTO-ENTMCNC: 34.5 G/DL (ref 31–36)
MCV RBC AUTO: 99.2 FL (ref 80–100)
MONOCYTES ABSOLUTE: 0.7 K/UL (ref 0–1.3)
MONOCYTES RELATIVE PERCENT: 11.1 %
NEUTROPHILS ABSOLUTE: 3.9 K/UL (ref 1.7–7.7)
NEUTROPHILS RELATIVE PERCENT: 60.4 %
PDW BLD-RTO: 13.1 % (ref 12.4–15.4)
PLATELET # BLD: 163 K/UL (ref 135–450)
PMV BLD AUTO: 8.5 FL (ref 5–10.5)
POTASSIUM SERPL-SCNC: 4.9 MMOL/L (ref 3.5–5.1)
PROSTATE SPECIFIC ANTIGEN: 1.04 NG/ML (ref 0–4)
RBC # BLD: 4.55 M/UL (ref 4.2–5.9)
SODIUM BLD-SCNC: 139 MMOL/L (ref 136–145)
TOTAL PROTEIN: 7.8 G/DL (ref 6.4–8.2)
TRIGLYCERIDE, FASTING: 79 MG/DL (ref 0–150)
VLDLC SERPL CALC-MCNC: 16 MG/DL
WBC # BLD: 6.5 K/UL (ref 4–11)

## 2021-06-14 PROCEDURE — 3017F COLORECTAL CA SCREEN DOC REV: CPT | Performed by: FAMILY MEDICINE

## 2021-06-14 PROCEDURE — 4040F PNEUMOC VAC/ADMIN/RCVD: CPT | Performed by: FAMILY MEDICINE

## 2021-06-14 PROCEDURE — G0439 PPPS, SUBSEQ VISIT: HCPCS | Performed by: FAMILY MEDICINE

## 2021-06-14 PROCEDURE — 1123F ACP DISCUSS/DSCN MKR DOCD: CPT | Performed by: FAMILY MEDICINE

## 2021-06-14 ASSESSMENT — PATIENT HEALTH QUESTIONNAIRE - PHQ9
SUM OF ALL RESPONSES TO PHQ QUESTIONS 1-9: 0
SUM OF ALL RESPONSES TO PHQ9 QUESTIONS 1 & 2: 0
2. FEELING DOWN, DEPRESSED OR HOPELESS: 0
SUM OF ALL RESPONSES TO PHQ QUESTIONS 1-9: 0
SUM OF ALL RESPONSES TO PHQ9 QUESTIONS 1 & 2: 0
1. LITTLE INTEREST OR PLEASURE IN DOING THINGS: 0
2. FEELING DOWN, DEPRESSED OR HOPELESS: 0
1. LITTLE INTEREST OR PLEASURE IN DOING THINGS: 0
SUM OF ALL RESPONSES TO PHQ QUESTIONS 1-9: 0

## 2021-06-14 ASSESSMENT — LIFESTYLE VARIABLES
HOW OFTEN DURING THE LAST YEAR HAVE YOU HAD A FEELING OF GUILT OR REMORSE AFTER DRINKING: NEVER
HAS A RELATIVE, FRIEND, DOCTOR, OR ANOTHER HEALTH PROFESSIONAL EXPRESSED CONCERN ABOUT YOUR DRINKING OR SUGGESTED YOU CUT DOWN: 0
HOW MANY STANDARD DRINKS CONTAINING ALCOHOL DO YOU HAVE ON A TYPICAL DAY: SEVEN TO NINE
AUDIT-C TOTAL SCORE: 0
HOW OFTEN DO YOU HAVE SIX OR MORE DRINKS ON ONE OCCASION: 3
HOW OFTEN DURING THE LAST YEAR HAVE YOU FAILED TO DO WHAT WAS NORMALLY EXPECTED FROM YOU BECAUSE OF DRINKING: 0
HOW OFTEN DO YOU HAVE A DRINK CONTAINING ALCOHOL: FOUR OR MORE TIMES A WEEK
HOW OFTEN DURING THE LAST YEAR HAVE YOU HAD A FEELING OF GUILT OR REMORSE AFTER DRINKING: 0
HOW OFTEN DURING THE LAST YEAR HAVE YOU NEEDED AN ALCOHOLIC DRINK FIRST THING IN THE MORNING TO GET YOURSELF GOING AFTER A NIGHT OF HEAVY DRINKING: 0
HAVE YOU OR SOMEONE ELSE BEEN INJURED AS A RESULT OF YOUR DRINKING: 0
HAVE YOU OR SOMEONE ELSE BEEN INJURED AS A RESULT OF YOUR DRINKING: NO
HOW OFTEN DURING THE LAST YEAR HAVE YOU BEEN UNABLE TO REMEMBER WHAT HAPPENED THE NIGHT BEFORE BECAUSE YOU HAD BEEN DRINKING: 0
HAS A RELATIVE, FRIEND, DOCTOR, OR ANOTHER HEALTH PROFESSIONAL EXPRESSED CONCERN ABOUT YOUR DRINKING OR SUGGESTED YOU CUT DOWN: NO
HOW OFTEN DURING THE LAST YEAR HAVE YOU FAILED TO DO WHAT WAS NORMALLY EXPECTED FROM YOU BECAUSE OF DRINKING: NEVER
HOW OFTEN DURING THE LAST YEAR HAVE YOU FOUND THAT YOU WERE NOT ABLE TO STOP DRINKING ONCE YOU HAD STARTED: NEVER
AUDIT TOTAL SCORE: 10
HOW OFTEN DURING THE LAST YEAR HAVE YOU FOUND THAT YOU WERE NOT ABLE TO STOP DRINKING ONCE YOU HAD STARTED: 0
HOW OFTEN DO YOU HAVE A DRINK CONTAINING ALCOHOL: 4
AUDIT-C TOTAL SCORE: 10
HOW OFTEN DURING THE LAST YEAR HAVE YOU NEEDED AN ALCOHOLIC DRINK FIRST THING IN THE MORNING TO GET YOURSELF GOING AFTER A NIGHT OF HEAVY DRINKING: NEVER
HOW OFTEN DO YOU HAVE SIX OR MORE DRINKS ON ONE OCCASION: WEEKLY
HOW MANY STANDARD DRINKS CONTAINING ALCOHOL DO YOU HAVE ON A TYPICAL DAY: 3
AUDIT TOTAL SCORE: 0
HOW OFTEN DURING THE LAST YEAR HAVE YOU BEEN UNABLE TO REMEMBER WHAT HAPPENED THE NIGHT BEFORE BECAUSE YOU HAD BEEN DRINKING: NEVER

## 2021-06-14 NOTE — PROGRESS NOTES
Medicare Annual Wellness Visit  Name: Redmond Soulier Date: 6/15/2021   MRN: [id number] Sex: Male   Age: 73 y.o. Ethnicity: Non-/Non    : 1950 Race: Marie Duff is here for Medicare AWV    Screenings for behavioral, psychosocial and functional/safety risks, and cognitive dysfunction are all negative except as indicated below. These results, as well as other patient data from the [address] form, are documented in Flowsheets linked to this Encounter. Allergies   Allergen Reactions    Pcn [Penicillins] Hives         Prior to Visit Medications    Medication Sig Taking?  Authorizing Provider   ZINC PO Take by mouth Yes Historical Provider, MD   carvedilol (COREG) 12.5 MG tablet TAKE 1 TABLET BY MOUTH TWICE A DAY Yes Denise Moody DO   lisinopril (PRINIVIL;ZESTRIL) 40 MG tablet TAKE 1 TABLET BY MOUTH EVERY DAY Yes Denise Moody DO   amLODIPine (NORVASC) 10 MG tablet TAKE 1 TABLET BY MOUTH EVERY DAY Yes Yesica Sprague DO   albuterol sulfate HFA (PROAIR HFA) 108 (90 Base) MCG/ACT inhaler Inhale 2 puffs into the lungs every 6 hours as needed for Wheezing Yes Denise Moody DO   THIAMINE HCL PO Take by mouth Yes Historical Provider, MD   Ascorbic Acid (VITAMIN C) 100 MG tablet Take 100 mg by mouth daily Yes Historical Provider, MD   aspirin 325 MG tablet Take 325 mg by mouth daily Yes Historical Provider, MD   VITAMIN D, CHOLECALCIFEROL, PO Take by mouth daily Yes Historical Provider, MD   acetaminophen (TYLENOL) 500 MG tablet Take 1,000 mg by mouth every 6 hours as needed Yes Historical Provider, MD   Omega-3 Fatty Acids (FISH OIL) 1000 MG CAPS Take 3,000 mg by mouth daily Yes Historical Provider, MD   B Complex Vitamins (B COMPLEX PO) Take by mouth Yes Historical Provider, MD   Multiple Vitamins-Minerals (MULTIVITAMIN ADULTS PO) Take by mouth Yes Historical Provider, MD   folic acid (FOLVITE) 1 MG tablet Take 1 tablet by mouth daily Yes Jameson Obando MD         Past Medical History:   Diagnosis Date    COPD (chronic obstructive pulmonary disease) ([address]     Full dentures     History of basal cell carcinoma     History of blood transfusion     History of gout 11/11/2020    Hx of blood clots     Arterial blockage in RLE    Hyperlipidemia     Hypertension     Primary osteoarthritis of right knee        Past Surgical History:   Procedure Laterality Date    ANGIOPLASTY      RLE    CAROTID STENT PLACEMENT  2016    [facility]    Inferior forehead    UPPER GASTROINTESTINAL ENDOSCOPY  10/17/2017    Dr. 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 Kvng Hall - few tiny antral erosions, biopsies done for H pylori, large varices in cardia         Family History   Problem Relation Age of Onset    No Known Problems Mother     High Blood Pressure Father        CareTeam (Including outside providers/suppliers regularly involved in providing care):   Patient Care Team:  Karen Jameson DO as PCP - General (Family Medicine)  Karen Jameson DO as PCP - [facility] Empaneled Provider    Wt Readings from Last 3 Encounters:   06/14/21 218 lb (98.9 kg)   11/11/20 223 lb (101.2 kg)   11/21/19 229 lb (103.9 kg)     Vitals:    06/14/21 0847 06/14/21 0850   BP: (!) 143/80 (!) 142/75   Pulse: 74    Temp: 98.2 °F (36.8 °C)    SpO2: 98%    Weight: 218 lb (98.9 kg)    Height: 5' 10\" (1.778 m)      Body mass index is 31.28 kg/m². Based upon direct observation of the patient, evaluation of cognition reveals recent and remote memory intact. Physical Exam  Vitals reviewed. Constitutional:       General: He is not in acute distress. Appearance: Normal appearance. He is well-developed. He is obese. HENT:      Head: Normocephalic and atraumatic. Right Ear: Tympanic membrane and ear canal normal. No drainage.  No middle ear effusion. Tympanic membrane is not erythematous. Left Ear: Tympanic membrane and ear canal normal. No drainage. No middle ear effusion. Tympanic membrane is not erythematous. Nose: Nose normal. No rhinorrhea. Mouth/Throat:      Mouth: Mucous membranes are moist.      Pharynx: No oropharyngeal exudate or posterior oropharyngeal erythema. Eyes:      Extraocular Movements: Extraocular movements intact. Pupils: Pupils are equal, round, and reactive to light. Neck:      Thyroid: No thyromegaly. Cardiovascular:      Rate and Rhythm: Normal rate and regular rhythm. Heart sounds: No murmur heard. Pulmonary:      Effort: Pulmonary effort is normal.      Breath sounds: Normal breath sounds. No wheezing. Abdominal:      General: Bowel sounds are normal.      Palpations: Abdomen is soft. There is no mass. Tenderness: There is no abdominal tenderness. Musculoskeletal:      Cervical back: Neck supple. Lymphadenopathy:      Cervical: No cervical adenopathy. Skin:     General: Skin is warm and dry. Findings: No rash. Neurological:      General: No focal deficit present. Mental Status: He is alert and oriented to person, place, and time. Cranial Nerves: No cranial nerve deficit. Psychiatric:         Mood and Affect: Mood normal.         Patient's complete Health Risk Assessment and screening values have been reviewed and are found in Flowsheets. The following problems were reviewed today and where indicated follow up appointments were made and/or referrals ordered.     Positive Risk Factor Screenings with Interventions:         Substance History:  Social History     Tobacco History     Smoking Status  Former Smoker Quit date  11/19/1994 Smoking Frequency  1.5 packs/day for 20 years (30 pk yrs) Smoking Tobacco Type  Cigarettes    Smokeless Tobacco Use  Never Used          Alcohol History     Alcohol Use Status  Yes Comment  Few times per week - beer          Drug Use Drug Use Status  No          Sexual Activity     Sexually Active  Not Asked               Alcohol Screening: Audit-C Score: 10  Total Score: 10    A score of 8 or more is associated with harmful or hazardous drinking. A score of 13 or more in women, and 15 or more in men, is likely to indicate alcohol dependence. General Health and ACP:  General  In general, how would you say your health is?: Good  In the past 7 days, have you experienced any of the following?  New or Increased Pain, New or Increased Fatigue, Loneliness, Social Isolation, Stress or Anger?: None of These  Do you get the social and emotional support that you need?: Yes  Do you have a Living Will?: (!) No  Advance Directives     Power of [address] [place] [address] Will ACP-Advance Directive ACP-Power of     Not on File Filed on 10/26/17 Filed Not on File        Health Habits/Nutrition:  Health Habits/Nutrition  Do you exercise for at least 20 minutes 2-3 times per week?: Yes  Have you lost any weight without trying in the past 3 months?: No  Do you eat only one meal per day?: No  Have you seen the dentist within the past year?: (!) No  Body mass index: (!) 31.28  Health Habits/Nutrition Interventions:  · Nutritional issues:  educational materials to promote weight loss provided  · Dental exam overdue:  patient encouraged to make appointment with his/her dentist    Hearing/Vision:  No exam data present  Hearing/Vision  Do you or your family notice any trouble with your hearing that hasn't been managed with hearing aids?: No  Do you have difficulty driving, watching TV, or doing any of your daily activities because of your eyesight?: No  Have you had an eye exam within the past year?: (!) No    Safety:  Safety  Do you have working smoke detectors?: Yes  Have all throw rugs been removed or fastened?: Yes  Do you have non-slip mats or surfaces in all bathtubs/showers?: (!) No  Do all of your stairways have a railing or banister?: Yes  Are your doorways, halls and stairs free of clutter?: Yes  Do you always fasten your seatbelt when you are in a car?: Yes     Personalized Preventive Plan   Current Health Maintenance Status  Immunization History   Administered Date(s) Administered    COVID-19, Morales Peter, PF, 30mcg/0.3mL 03/06/2021, 03/27/2021    Hepatitis B 10/19/2017, 01/30/2018, 10/23/2018, 11/19/2018, 04/22/2019    Hepatitis B Adult (Engerix-B) 10/19/2017, 11/19/2018, 04/22/2019    Hepatitis B Adult (Recombivax HB) 10/23/2018    Influenza Virus Vaccine 11/05/2003    Influenza, High Dose (Fluzone 65 yrs and older) 10/17/2017, 10/23/2018, 11/19/2019    Influenza, Quadv, adjuvanted, 65 yrs +, IM, PF (Fluad) 11/11/2020    Pneumococcal Conjugate 13-valent ([id number]) 01/05/2018    Pneumococcal Polysaccharide ([id number]) 04/22/2019    Td, unspecified formulation 01/01/2002    Tdap (Boostrix, Adacel) 02/09/2018    Zoster Recombinant (Shingrix) 04/25/2019        Health Maintenance   Topic Date Due    Colon cancer screen colonoscopy  Never done    Annual Wellness Visit (AWV)  Never done    Shingles Vaccine (2 of 2) 06/14/2070 (Originally 6/20/2019)    A1C test (Diabetic or Prediabetic)  06/14/2022    Potassium monitoring  06/14/2022    Creatinine monitoring  06/14/2022    Lipid screen  06/14/2026    DTaP/Tdap/Td vaccine (2 - Td or Tdap) 02/09/2028    Flu vaccine  Completed    Pneumococcal 65+ years Vaccine  Completed    COVID-19 Vaccine  Completed    AAA screen  Completed    Hepatitis C screen  Completed    Hepatitis A vaccine  Aged Out    Hepatitis B vaccine  Aged Out    Hib vaccine  Aged Out    Meningococcal (ACWY) vaccine  Aged Out     Recommendations for MRI Interventions Due: see orders and patient instructions/AVS.  . Recommended screening schedule for the next 5-10 years is provided to the patient in written form: see Patient Instructions/AVS.    Coletta Ahumada was seen today for medicare awv.     Diagnoses and all orders for this public notary or an eligible witness and provide a signed copy to the practice office. Time spent (minutes): 2     Cardiovascular Disease Risk Counseling: Assessed the patient's risk to develop cardiovascular disease and reviewed main risk factors. Reviewed steps to reduce disease risk including:   · Quitting tobacco use, reducing amount smoked, or not starting the habit  · Making healthy food choices  · Being physically active and gradualy increasing activity levels   · Reduce weight and determine a healthy BMI goal  · Monitor blood pressure and treat if higher than 140/90 mmHg  · Maintain blood total cholesterol levels under 5 mmol/l or 190 mg/dl  · Maintain LDL cholesterol levels under 3.0 mmol/l or 115 mg/dl   · Control blood glucose levels  · Consider taking aspirin (75 mg daily), once blood pressure is controlled   Provided a follow up plan.   Time spent (minutes): 2              Awa Dumont, DO

## 2021-06-14 NOTE — PATIENT INSTRUCTIONS
(Maybe you're afraid of having pain, losing your independence, or being kept alive by machines.)  · Where would you prefer to die? (Your home? A hospital? A nursing home?)  · Do you want to donate your organs when you die? · Do you want certain Nondenominational practices performed before you die? When should you call for help? Be sure to contact your doctor if you have any questions. Where can you learn more? Go to [email or web address] and sign in to your Intense account. Enter R264 in the [employer] box to learn more about \"Advance Directives: Care Instructions. \"     If you do not have an account, please click on the \"Sign Up Now\" link. Current as of: July 17, 2020               Content Version: 12.8  © 2006-2021 Healthwise, [employer]. Care instructions adapted under license by [facility] ([facility]). If you have questions about a medical condition or this instruction, always ask your healthcare professional. [place] [address] any warranty or liability for your use of this information. Learning About Medical Power of   What is a medical power of ? A medical power of , also called a durable power of  for health care, is one type of the legal forms called advance directives. It lets you name the person you want to make treatment decisions for you if you can't speak or decide for yourself. The person you choose is called your health care agent. This person is also called a health care proxy or health care surrogate. A medical power of  may be called something else in your state. How do you choose a health care agent? Choose your health care agent carefully. This person may or may not be a family member. Talk to the person before you make your final decision. Make sure he or she is comfortable with this responsibility. It's a good idea to choose someone who:  · Is at least 25years old.   · Knows you well and understands what makes life meaningful for you. · Understands your Mu-ism and moral values. · Will do what you want, not what he or she wants. · Will be able to make difficult choices at a stressful time. · Will be able to refuse or stop treatment, if that is what you would want, even if you could die. · Will be firm and confident with health professionals if needed. · Will ask questions to get needed information. · Lives near you or agrees to travel to you if needed. Your family may help you make medical decisions while you can still be part of that process. But it's important to choose one person to be your health care agent in case you aren't able to make decisions for yourself. If you don't fill out the legal form and name a health care agent, the decisions your family can make may be limited. A health care agent may be called something else in your state. Who will make decisions for you if you don't have a health care agent? If you don't have a health care agent or a living will, you may not get the care you want. Decisions may be made by family members who disagree about your medical care. Or decisions may be made by a medical professional who doesn't know you well. In some cases, a  makes the decisions. When you name a health care agent, it is very clear who has the power to make health decisions for you. How do you name a health care agent? You name your health care agent on a legal form. This form is usually called a medical power of . Ask your hospital, state bar association, or office on aging where to find these forms. You must sign the form to make it legal. Some states require you to get the form notarized. This means that a person called a  watches you sign the form and then he or she signs the form. Some states also require that two or more witnesses sign the form. Be sure to tell your family members and doctors who your health care agent is.   Where can you learn more? Go to [email or web address] and sign in to your DealerSocket account. Enter [id number] in the [facility] box to learn more about \"Learning About [address]. \"     If you do not have an account, please click on the \"Sign Up Now\" link. Current as of: July 17, 2020               Content Version: 12.8  © 2006-2021 [employer]. Care instructions adapted under license by [facility]. If you have questions about a medical condition or this instruction, always ask your healthcare professional. [address] any warranty or liability for your use of this information. Learning About Living Kim Rodrigues  What is a living will? A living will, also called a declaration, is a legal form. It tells your family and your doctor your wishes when you can't speak for yourself. It's used by the health professionals who will treat you as you near the end of your life or if you get seriously hurt or ill. If you put your wishes in writing, your loved ones and others will know what kind of care you want. They won't need to guess. This can ease your mind and be helpful to others. And you can change or cancel your living will at any time. A living will is not the same as an estate or property will. An estate will explains what you want to happen with your money and property after you die. How do you use it? A living will is used to describe the kinds of treatment or life support you want as you near the end of your life or if you get seriously hurt or ill. Keep these facts in mind about living underwood. · Your living will is used only if you can't speak or make decisions for yourself. Most often, one or more doctors must certify that you can't speak or decide for yourself before your living will takes effect. · If you get better and can speak for yourself again, you can accept or refuse any treatment.  It doesn't matter what you said in your living will. · Some states may limit your right to refuse treatment in certain cases. For example, you may need to clearly state in your living will that you don't want artificial hydration and nutrition, such as being fed through a tube. Is a living will a legal document? A living will is a legal document. Each state has its own laws about living underwood. And a living will may be called something else in your state. Here are some things to know about living underwood. · You don't need an  to complete a living will. But legal advice can be helpful if your state's laws are unclear. It can also help if your health history is complicated or your family can't agree on what should be in your living will. · You can change your living will at any time. Some people find that their wishes about end-of-life care change as their health changes. If you make big changes to your living will, complete a new form. · If you move to another state, make sure that your living will is legal in the state where you now live. In most cases, doctors will respect your wishes even if you have a form from a different state. · You might use a universal form that has been approved by many states. This kind of form can sometimes be filled out and stored online. Your digital copy will then be available wherever you have a connection to the internet. The doctors and nurses who need to treat you can find it right away. · Your state may offer an online registry. This is another place where you can store your living will online. · It's a good idea to get your living will notarized. This means using a person called a  to watch two people sign, or witness, your living will. What should you know when you create a living will? Here are some questions to ask yourself as you make your living will:  · Do you know enough about life support methods that might be used?  If not, talk to your doctor so you know what might be done if you can't breathe on your own, your heart stops, or you can't swallow. · What things would you still want to be able to do after you receive life-support methods? Would you want to be able to walk? To speak? To eat on your own? To live without the help of machines? · Do you want certain Christian practices performed if you become very ill? · If you have a choice, where do you want to be cared for? In your home? At a hospital or nursing home? · If you have a choice at the end of your life, where would you prefer to die? At home? In a hospital or nursing home? Somewhere else? · Would you prefer to be buried or cremated? · Do you want your organs to be donated after you die? What should you do with your living will? · Make sure that your family members and your health care agent have copies of your living will (also called a declaration). · Give your doctor a copy of your living will. Ask him or her to keep it as part of your medical record. If you have more than one doctor, make sure that each one has a copy. · Put a copy of your living will where it can be easily found. For example, some people may put a copy on their refrigerator door. If you are using a digital copy, be sure your doctor, family members, and health care agent know how to find and access it. Where can you learn more? Go to [email or web address] and sign in to your Grocio account. Enter U448 in the [facility] box to learn more about \"Learning About Living [place]. \"     If you do not have an account, please click on the \"Sign Up Now\" link. Current as of: July 17, 2020               Content Version: 12.8  © 0321-5673 Healthwise, Curalate. Care instructions adapted under license by [facility] ([facility]). If you have questions about a medical condition or this instruction, always ask your healthcare professional. [address] any warranty or liability for your use of this information. Personalized Preventive Plan for Edwardo Ellison - 6/14/2021  Medicare offers a range of preventive health benefits. Some of the tests and screenings are paid in full while other may be subject to a deductible, co-insurance, and/or copay. Some of these benefits include a comprehensive review of your medical history including lifestyle, illnesses that may run in your family, and various assessments and screenings as appropriate. After reviewing your medical record and screening and assessments performed today your provider may have ordered immunizations, labs, imaging, and/or referrals for you. A list of these orders (if applicable) as well as your Preventive Care list are included within your After Visit Summary for your review. Other Preventive Recommendations:    · A preventive eye exam performed by an eye specialist is recommended every 1-2 years to screen for glaucoma; cataracts, macular degeneration, and other eye disorders. · A preventive dental visit is recommended every 6 months. · Try to get at least 150 minutes of exercise per week or 10,000 steps per day on a pedometer . · Order or download the FREE \"Exercise & Physical Activity: Your Everyday Guide\" from [employer]. Call 2-910.174.3671 or search [employer] online. · You need 8659-8614 mg of calcium and 7012-9743 IU of vitamin D per day. It is possible to meet your calcium requirement with diet alone, but a vitamin D supplement is usually necessary to meet this goal.  · When exposed to the sun, use a sunscreen that protects against both UVA and UVB radiation with an SPF of 30 or greater. Reapply every 2 to 3 hours or after sweating, drying off with a towel, or swimming. · Always wear a seat belt when traveling in a car. Always wear a helmet when riding a bicycle or motorcycle.

## 2021-06-15 LAB
ESTIMATED AVERAGE GLUCOSE: 128.4 MG/DL
HBA1C MFR BLD: 6.1 %

## 2021-11-04 DIAGNOSIS — I10 ESSENTIAL HYPERTENSION: ICD-10-CM

## 2021-11-04 RX ORDER — LISINOPRIL 40 MG/1
TABLET ORAL
Qty: 90 TABLET | Refills: 1 | Status: SHIPPED | OUTPATIENT
Start: 2021-11-04 | End: 2022-05-04

## 2021-11-04 RX ORDER — CARVEDILOL 12.5 MG/1
TABLET ORAL
Qty: 180 TABLET | Refills: 1 | Status: SHIPPED | OUTPATIENT
Start: 2021-11-04 | End: 2022-05-04

## 2021-11-04 RX ORDER — AMLODIPINE BESYLATE 10 MG/1
TABLET ORAL
Qty: 90 TABLET | Refills: 1 | Status: SHIPPED | OUTPATIENT
Start: 2021-11-04 | End: 2022-05-04

## 2022-01-27 ENCOUNTER — VIRTUAL VISIT (OUTPATIENT)
Dept: PRIMARY CARE CLINIC | Age: 72
End: 2022-01-27
Payer: MEDICARE

## 2022-01-27 ENCOUNTER — NURSE ONLY (OUTPATIENT)
Dept: PRIMARY CARE CLINIC | Age: 72
End: 2022-01-27

## 2022-01-27 DIAGNOSIS — J44.9 COPD, MILD (HCC): ICD-10-CM

## 2022-01-27 DIAGNOSIS — R42 DIZZINESS: ICD-10-CM

## 2022-01-27 DIAGNOSIS — R09.81 NASAL CONGESTION: ICD-10-CM

## 2022-01-27 DIAGNOSIS — R07.89 FEELING OF CHEST TIGHTNESS: Primary | ICD-10-CM

## 2022-01-27 LAB
Lab: NORMAL
QC PASS/FAIL: NORMAL
SARS-COV-2 RDRP RESP QL NAA+PROBE: NEGATIVE

## 2022-01-27 PROCEDURE — 1123F ACP DISCUSS/DSCN MKR DOCD: CPT | Performed by: FAMILY MEDICINE

## 2022-01-27 PROCEDURE — 87635 SARS-COV-2 COVID-19 AMP PRB: CPT | Performed by: FAMILY MEDICINE

## 2022-01-27 PROCEDURE — 99213 OFFICE O/P EST LOW 20 MIN: CPT | Performed by: FAMILY MEDICINE

## 2022-01-27 PROCEDURE — 4040F PNEUMOC VAC/ADMIN/RCVD: CPT | Performed by: FAMILY MEDICINE

## 2022-01-27 PROCEDURE — G8427 DOCREV CUR MEDS BY ELIG CLIN: HCPCS | Performed by: FAMILY MEDICINE

## 2022-01-27 PROCEDURE — 3017F COLORECTAL CA SCREEN DOC REV: CPT | Performed by: FAMILY MEDICINE

## 2022-01-27 RX ORDER — PREDNISONE 20 MG/1
40 TABLET ORAL DAILY
Qty: 10 TABLET | Refills: 0 | Status: SHIPPED | OUTPATIENT
Start: 2022-01-27 | End: 2022-02-01

## 2022-01-27 NOTE — PROGRESS NOTES
aspirin 325 MG tablet Take 325 mg by mouth daily Yes Historical Provider, MD   VITAMIN D, CHOLECALCIFEROL, PO Take by mouth daily Yes Historical Provider, MD   acetaminophen (TYLENOL) 500 MG tablet Take 1,000 mg by mouth every 6 hours as needed Yes Historical Provider, MD   Omega-3 Fatty Acids (FISH OIL) 1000 MG CAPS Take 3,000 mg by mouth daily Yes Historical Provider, MD   B Complex Vitamins (B COMPLEX PO) Take by mouth Yes Historical Provider, MD   Multiple Vitamins-Minerals (MULTIVITAMIN ADULTS PO) Take by mouth Yes Historical Provider, MD   folic acid (FOLVITE) 1 MG tablet Take 1 tablet by mouth daily Yes Negar Hutchins MD       Past Medical History:   Diagnosis Date    COPD [facility]ronic obstructive pulmonary disease) ([facility] [address]     Full dentures     History of basal cell carcinoma     History of blood transfusion     History of gout 2020    Hx of blood clots     Arterial blockage in RLE    Hyperlipidemia     Hypertension     Primary osteoarthritis of right knee         Social History     Tobacco Use    Smoking status: Former Smoker     Packs/day: 1.50     Years: 20.00     Pack years: 30.00     Types: Cigarettes     Quit date: 1994     Years since quittin.2    Smokeless tobacco: Never Used   Vaping Use    Vaping Use: Never used   Substance Use Topics    Alcohol use: Yes     Comment: Few times per week - beer    Drug use: No        Past Surgical History:   Procedure Laterality Date    ANGIOPLASTY      RLE    CAROTID STENT PLACEMENT  2016    MOHS SURGERY  2019    Inferior forehead    UPPER GASTROINTESTINAL ENDOSCOPY  10/17/2017    Dr. 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 Bob Grow - few tiny antral erosions, biopsies done for H pylori, large varices in cardia        Allergies   Allergen Reactions    Pcn [Penicillins] Hives        Family History   Problem Relation Age of Onset    No Known Problems Mother     High Blood Pressure Father         Patient's past medical history, surgical history, family history, medications, and allergies  were all reviewed and updated as appropriate today. Review of systems per HPI above, otherwise negative. Vitals unable to obtain 2/2 virtual visit nature of this encounter. Physical Exam  Constitutional:       Appearance: Normal appearance. He is not toxic-appearing. HENT:      Head: Normocephalic and atraumatic. Eyes:      Extraocular Movements: Extraocular movements intact. Conjunctiva/sclera: Conjunctivae normal.   Pulmonary:      Effort: Pulmonary effort is normal. No respiratory distress. Neurological:      General: No focal deficit present. Mental Status: He is alert and oriented to person, place, and time. Psychiatric:         Mood and Affect: Mood normal.         Thought Content: Thought content normal.         Assessment:  Encounter Diagnoses   Name Primary?  Feeling of chest tightness Yes    COPD, mild (HCC)     Nasal congestion     Dizziness        Plan:  1. Feeling of chest tightness  Acute onset mild illness c/w viral etiology and/or allergies. Exam nonfocal and no red flags on hx. Discussed expected course of illness and OTC agents that can be used for symptomatic relief. Given precautions and answered all questions. - POCT COVID-19 Rapid, NAAT  - predniSONE (DELTASONE) 20 MG tablet; Take 2 tablets by mouth daily for 5 days  Dispense: 10 tablet; Refill: 0    2. COPD, mild ([address]  Acute symptoms consistent with mild COPD exacerbation. Unknown trigger at this time, but etiologies discussed. Exacerbation mild to moderate in nature, does not warrant emergency department evaluation. No indication for labs/chest x-ray at this time. We will initiate steroid burst and I have given specific instructions on use of albuterol during this time.   Specific precautions discussed regarding progressively worsening respiratory symptoms or new symptoms altogether and what approach should be taken in that scenario. I have answered all questions. We will plan short-term follow-up to ensure improvement. 3. Nasal congestion  - POCT COVID-19 Rapid, NAAT  - predniSONE (DELTASONE) 20 MG tablet; Take 2 tablets by mouth daily for 5 days  Dispense: 10 tablet; Refill: 0    4. Dizziness  Suspect related to above. No red flag symptoms. Monitor and given precautions. - POCT COVID-19 Rapid, NAAT      Return call with update next week. DO Catalina Rueda is a 73 y.o. male being evaluated by a Virtual Visit (video visit) encounter to address concerns as mentioned above. A caregiver was present when appropriate. Due to this being a TeleHealth encounter (During YJPDS-46 public health emergency), evaluation of the following organ systems was limited: Vitals/Constitutional/EENT/Resp/CV/GI//MS/Neuro/Skin/Heme-Lymph-Imm. Pursuant to the emergency declaration under the [address] [place] [address] and the [employer] and [employer] Act, this Virtual Visit was conducted with patient's (and/or legal guardian's) consent, to reduce the patient's risk of exposure to COVID-19 and provide necessary medical care. The patient (and/or legal guardian) has also been advised to contact this office for worsening conditions or problems, and seek emergency medical treatment and/or call 911 if deemed necessary. Please note that this chart was generated using dragon dictation software. Although every effort was made to ensure the accuracy of this automated transcription, some errors in transcription may have occurred.

## 2022-05-04 DIAGNOSIS — I10 ESSENTIAL HYPERTENSION: ICD-10-CM

## 2022-05-04 RX ORDER — AMLODIPINE BESYLATE 10 MG/1
TABLET ORAL
Qty: 90 TABLET | Refills: 1 | Status: SHIPPED | OUTPATIENT
Start: 2022-05-04

## 2022-05-04 RX ORDER — CARVEDILOL 12.5 MG/1
TABLET ORAL
Qty: 180 TABLET | Refills: 1 | Status: SHIPPED | OUTPATIENT
Start: 2022-05-04

## 2022-05-04 RX ORDER — LISINOPRIL 40 MG/1
TABLET ORAL
Qty: 90 TABLET | Refills: 1 | Status: SHIPPED | OUTPATIENT
Start: 2022-05-04

## 2022-11-03 DIAGNOSIS — I10 ESSENTIAL HYPERTENSION: ICD-10-CM

## 2022-11-03 RX ORDER — AMLODIPINE BESYLATE 10 MG/1
TABLET ORAL
Qty: 90 TABLET | Refills: 1 | OUTPATIENT
Start: 2022-11-03

## 2022-11-03 RX ORDER — CARVEDILOL 12.5 MG/1
TABLET ORAL
Qty: 180 TABLET | Refills: 1 | OUTPATIENT
Start: 2022-11-03

## 2022-11-03 RX ORDER — LISINOPRIL 40 MG/1
TABLET ORAL
Qty: 90 TABLET | Refills: 1 | OUTPATIENT
Start: 2022-11-03

## 2022-11-03 NOTE — TELEPHONE ENCOUNTER
Medication:   Requested Prescriptions     Pending Prescriptions Disp Refills    amLODIPine (NORVASC) 10 MG tablet [Pharmacy Med Name: AMLODIPINE BESYLATE 10 MG TAB] 90 tablet 1     Sig: TAKE 1 TABLET BY MOUTH EVERY DAY    carvedilol (COREG) 12.5 MG tablet [Pharmacy Med Name: CARVEDILOL 12.5 MG TABLET] 180 tablet 1     Sig: TAKE 1 TABLET BY MOUTH TWICE A DAY    lisinopril (PRINIVIL;ZESTRIL) 40 MG tablet [Pharmacy Med Name: LISINOPRIL 40 MG TABLET] 90 tablet 1     Sig: TAKE 1 TABLET BY MOUTH EVERY DAY     Last Filled:  8/3/22    Last appt: 1/27/2022   Next appt: Visit date not found

## 2022-12-12 DIAGNOSIS — I10 ESSENTIAL HYPERTENSION: ICD-10-CM

## 2022-12-12 RX ORDER — AMLODIPINE BESYLATE 10 MG/1
TABLET ORAL
Qty: 90 TABLET | Refills: 1 | OUTPATIENT
Start: 2022-12-12

## 2022-12-12 RX ORDER — CARVEDILOL 12.5 MG/1
TABLET ORAL
Qty: 180 TABLET | Refills: 1 | OUTPATIENT
Start: 2022-12-12

## 2022-12-12 RX ORDER — LISINOPRIL 40 MG/1
TABLET ORAL
Qty: 90 TABLET | Refills: 1 | OUTPATIENT
Start: 2022-12-12

## 2023-01-01 ENCOUNTER — APPOINTMENT (OUTPATIENT)
Dept: GENERAL RADIOLOGY | Age: 73
End: 2023-01-01
Payer: MEDICARE

## 2023-01-01 ENCOUNTER — APPOINTMENT (OUTPATIENT)
Dept: CT IMAGING | Age: 73
End: 2023-01-01
Payer: MEDICARE

## 2023-01-01 ENCOUNTER — ANESTHESIA EVENT (OUTPATIENT)
Dept: ENDOSCOPY | Age: 73
End: 2023-01-01
Payer: MEDICARE

## 2023-01-01 ENCOUNTER — OFFICE VISIT (OUTPATIENT)
Dept: PRIMARY CARE CLINIC | Age: 73
End: 2023-01-01
Payer: MEDICARE

## 2023-01-01 ENCOUNTER — ANESTHESIA (OUTPATIENT)
Dept: ENDOSCOPY | Age: 73
End: 2023-01-01
Payer: MEDICARE

## 2023-01-01 ENCOUNTER — APPOINTMENT (OUTPATIENT)
Dept: ULTRASOUND IMAGING | Age: 73
End: 2023-01-01
Payer: MEDICARE

## 2023-01-01 ENCOUNTER — HOSPITAL ENCOUNTER (INPATIENT)
Age: 73
LOS: 6 days | End: 2023-06-22
Attending: STUDENT IN AN ORGANIZED HEALTH CARE EDUCATION/TRAINING PROGRAM | Admitting: ANESTHESIOLOGY
Payer: MEDICARE

## 2023-01-01 VITALS
OXYGEN SATURATION: 100 % | WEIGHT: 193 LBS | DIASTOLIC BLOOD PRESSURE: 85 MMHG | TEMPERATURE: 97.7 F | HEART RATE: 106 BPM | SYSTOLIC BLOOD PRESSURE: 134 MMHG | BODY MASS INDEX: 27.69 KG/M2

## 2023-01-01 VITALS
HEIGHT: 70 IN | HEART RATE: 93 BPM | BODY MASS INDEX: 30.87 KG/M2 | TEMPERATURE: 95.2 F | SYSTOLIC BLOOD PRESSURE: 76 MMHG | DIASTOLIC BLOOD PRESSURE: 56 MMHG | RESPIRATION RATE: 36 BRPM | WEIGHT: 215.61 LBS | OXYGEN SATURATION: 60 %

## 2023-01-01 DIAGNOSIS — I10 ESSENTIAL HYPERTENSION: ICD-10-CM

## 2023-01-01 DIAGNOSIS — E87.1 HYPONATREMIA: ICD-10-CM

## 2023-01-01 DIAGNOSIS — R39.89 DARK YELLOW-COLORED URINE: ICD-10-CM

## 2023-01-01 DIAGNOSIS — K74.60 HEPATIC CIRRHOSIS, UNSPECIFIED HEPATIC CIRRHOSIS TYPE, UNSPECIFIED WHETHER ASCITES PRESENT (HCC): ICD-10-CM

## 2023-01-01 DIAGNOSIS — J96.01 SEPSIS WITH ACUTE HYPOXIC RESPIRATORY FAILURE AND SEPTIC SHOCK, DUE TO UNSPECIFIED ORGANISM (HCC): ICD-10-CM

## 2023-01-01 DIAGNOSIS — N17.9 AKI (ACUTE KIDNEY INJURY) (HCC): ICD-10-CM

## 2023-01-01 DIAGNOSIS — R11.2 NAUSEA AND VOMITING, UNSPECIFIED VOMITING TYPE: ICD-10-CM

## 2023-01-01 DIAGNOSIS — D72.829 LEUKOCYTOSIS, UNSPECIFIED TYPE: ICD-10-CM

## 2023-01-01 DIAGNOSIS — R73.03 BORDERLINE DIABETES: ICD-10-CM

## 2023-01-01 DIAGNOSIS — K74.69 OTHER CIRRHOSIS OF LIVER (HCC): ICD-10-CM

## 2023-01-01 DIAGNOSIS — A41.9 SEPSIS WITH ACUTE HYPOXIC RESPIRATORY FAILURE AND SEPTIC SHOCK, DUE TO UNSPECIFIED ORGANISM (HCC): ICD-10-CM

## 2023-01-01 DIAGNOSIS — K86.3 PANCREATIC PSEUDOCYST: Primary | ICD-10-CM

## 2023-01-01 DIAGNOSIS — R63.0 LOSS OF APPETITE: ICD-10-CM

## 2023-01-01 DIAGNOSIS — R73.03 BORDERLINE DIABETES: Primary | ICD-10-CM

## 2023-01-01 DIAGNOSIS — A41.9 SEPSIS, DUE TO UNSPECIFIED ORGANISM, UNSPECIFIED WHETHER ACUTE ORGAN DYSFUNCTION PRESENT (HCC): ICD-10-CM

## 2023-01-01 DIAGNOSIS — Z12.5 SCREENING FOR PROSTATE CANCER: ICD-10-CM

## 2023-01-01 DIAGNOSIS — R14.0 ABDOMINAL DISTENSION: Primary | ICD-10-CM

## 2023-01-01 DIAGNOSIS — E88.09 HYPOALBUMINEMIA: ICD-10-CM

## 2023-01-01 DIAGNOSIS — R65.21 SEPSIS WITH ACUTE HYPOXIC RESPIRATORY FAILURE AND SEPTIC SHOCK, DUE TO UNSPECIFIED ORGANISM (HCC): ICD-10-CM

## 2023-01-01 DIAGNOSIS — R11.0 NAUSEA: ICD-10-CM

## 2023-01-01 DIAGNOSIS — Z87.891 FORMER CIGARETTE SMOKER: ICD-10-CM

## 2023-01-01 LAB
ALBUMIN FLD-MCNC: 0.6 G/DL
ALBUMIN SERPL-MCNC: 2.6 G/DL (ref 3.4–5)
ALBUMIN SERPL-MCNC: 2.7 G/DL (ref 3.4–5)
ALBUMIN SERPL-MCNC: 2.8 G/DL (ref 3.4–5)
ALBUMIN SERPL-MCNC: 2.9 G/DL (ref 3.4–5)
ALBUMIN SERPL-MCNC: 2.9 G/DL (ref 3.4–5)
ALBUMIN SERPL-MCNC: 3 G/DL (ref 3.4–5)
ALBUMIN SERPL-MCNC: 3.3 G/DL (ref 3.4–5)
ALBUMIN SERPL-MCNC: 3.5 G/DL (ref 3.4–5)
ALBUMIN/GLOB SERPL: 0.6 {RATIO} (ref 1.1–2.2)
ALBUMIN/GLOB SERPL: 0.6 {RATIO} (ref 1.1–2.2)
ALBUMIN/GLOB SERPL: 1 {RATIO} (ref 1.1–2.2)
ALBUMIN/GLOB SERPL: 1 {RATIO} (ref 1.1–2.2)
ALBUMIN/GLOB SERPL: 1.4 {RATIO} (ref 1.1–2.2)
ALP SERPL-CCNC: 115 U/L (ref 40–129)
ALP SERPL-CCNC: 137 U/L (ref 40–129)
ALP SERPL-CCNC: 137 U/L (ref 40–129)
ALP SERPL-CCNC: 139 U/L (ref 40–129)
ALP SERPL-CCNC: 139 U/L (ref 40–129)
ALP SERPL-CCNC: 141 U/L (ref 40–129)
ALP SERPL-CCNC: 161 U/L (ref 40–129)
ALP SERPL-CCNC: 179 U/L (ref 40–129)
ALP SERPL-CCNC: 190 U/L (ref 40–129)
ALT SERPL-CCNC: 14 U/L (ref 10–40)
ALT SERPL-CCNC: 205 U/L (ref 10–40)
ALT SERPL-CCNC: 21 U/L (ref 10–40)
ALT SERPL-CCNC: 272 U/L (ref 10–40)
ALT SERPL-CCNC: 31 U/L (ref 10–40)
ALT SERPL-CCNC: 33 U/L (ref 10–40)
ALT SERPL-CCNC: 33 U/L (ref 10–40)
ALT SERPL-CCNC: 544 U/L (ref 10–40)
ALT SERPL-CCNC: 79 U/L (ref 10–40)
ANION GAP SERPL CALCULATED.3IONS-SCNC: 11 MMOL/L (ref 3–16)
ANION GAP SERPL CALCULATED.3IONS-SCNC: 12 MMOL/L (ref 3–16)
ANION GAP SERPL CALCULATED.3IONS-SCNC: 13 MMOL/L (ref 3–16)
ANION GAP SERPL CALCULATED.3IONS-SCNC: 13 MMOL/L (ref 3–16)
ANION GAP SERPL CALCULATED.3IONS-SCNC: 14 MMOL/L (ref 3–16)
ANION GAP SERPL CALCULATED.3IONS-SCNC: 15 MMOL/L (ref 3–16)
ANION GAP SERPL CALCULATED.3IONS-SCNC: 33 MMOL/L (ref 3–16)
ANION GAP SERPL CALCULATED.3IONS-SCNC: 35 MMOL/L (ref 3–16)
ANION GAP SERPL CALCULATED.3IONS-SCNC: 36 MMOL/L (ref 3–16)
ANION GAP SERPL CALCULATED.3IONS-SCNC: 37 MMOL/L (ref 3–16)
ANION GAP SERPL CALCULATED.3IONS-SCNC: 39 MMOL/L (ref 3–16)
ANION GAP SERPL CALCULATED.3IONS-SCNC: 7 MMOL/L (ref 3–16)
ANION GAP SERPL CALCULATED.3IONS-SCNC: 9 MMOL/L (ref 3–16)
APPEARANCE FLUID: CLEAR
AST SERPL-CCNC: 1264 U/L (ref 15–37)
AST SERPL-CCNC: 2545 U/L (ref 15–37)
AST SERPL-CCNC: 330 U/L (ref 15–37)
AST SERPL-CCNC: 38 U/L (ref 15–37)
AST SERPL-CCNC: 40 U/L (ref 15–37)
AST SERPL-CCNC: 58 U/L (ref 15–37)
AST SERPL-CCNC: 61 U/L (ref 15–37)
AST SERPL-CCNC: 63 U/L (ref 15–37)
AST SERPL-CCNC: 932 U/L (ref 15–37)
BACTERIA BLD CULT: NORMAL
BACTERIA URNS QL MICRO: ABNORMAL /HPF
BASE EXCESS BLDA CALC-SCNC: -20 MMOL/L (ref -3–3)
BASE EXCESS BLDA CALC-SCNC: -21 MMOL/L (ref -3–3)
BASE EXCESS BLDA CALC-SCNC: -26 MMOL/L (ref -3–3)
BASE EXCESS BLDA CALC-SCNC: -30 MMOL/L (ref -3–3)
BASE EXCESS BLDA CALC-SCNC: 0 MMOL/L (ref -3–3)
BASE EXCESS BLDA CALC-SCNC: <-30 MMOL/L (ref -3–3)
BASE EXCESS BLDA CALC-SCNC: ABNORMAL MMOL/L (ref -3–3)
BASOPHILS # BLD: 0 K/UL (ref 0–0.2)
BASOPHILS # BLD: 0 K/UL (ref 0–0.2)
BASOPHILS # BLD: 0.1 K/UL (ref 0–0.2)
BASOPHILS NFR BLD: 0 %
BASOPHILS NFR BLD: 0.4 %
BASOPHILS NFR BLD: 0.5 %
BASOPHILS NFR BLD: 0.7 %
BASOPHILS NFR BLD: 0.7 %
BASOPHILS NFR BLD: 1 %
BASOPHILS NFR BLD: 1.3 %
BDY FLUID QUALITY: NORMAL
BILIRUB DIRECT SERPL-MCNC: 0.5 MG/DL (ref 0–0.3)
BILIRUB DIRECT SERPL-MCNC: 1.1 MG/DL (ref 0–0.3)
BILIRUB DIRECT SERPL-MCNC: 1.2 MG/DL (ref 0–0.3)
BILIRUB DIRECT SERPL-MCNC: 1.2 MG/DL (ref 0–0.3)
BILIRUB INDIRECT SERPL-MCNC: 1 MG/DL (ref 0–1)
BILIRUB INDIRECT SERPL-MCNC: 1.3 MG/DL (ref 0–1)
BILIRUB INDIRECT SERPL-MCNC: 1.5 MG/DL (ref 0–1)
BILIRUB INDIRECT SERPL-MCNC: 1.6 MG/DL (ref 0–1)
BILIRUB SERPL-MCNC: 1.1 MG/DL (ref 0–1)
BILIRUB SERPL-MCNC: 1.5 MG/DL (ref 0–1)
BILIRUB SERPL-MCNC: 1.7 MG/DL (ref 0–1)
BILIRUB SERPL-MCNC: 2.5 MG/DL (ref 0–1)
BILIRUB SERPL-MCNC: 2.7 MG/DL (ref 0–1)
BILIRUB SERPL-MCNC: 2.9 MG/DL (ref 0–1)
BILIRUB SERPL-MCNC: 3.1 MG/DL (ref 0–1)
BILIRUB UR QL STRIP.AUTO: ABNORMAL
BUN SERPL-MCNC: 11 MG/DL (ref 7–20)
BUN SERPL-MCNC: 12 MG/DL (ref 7–20)
BUN SERPL-MCNC: 14 MG/DL (ref 7–20)
BUN SERPL-MCNC: 15 MG/DL (ref 7–20)
BUN SERPL-MCNC: 18 MG/DL (ref 7–20)
BUN SERPL-MCNC: 19 MG/DL (ref 7–20)
BUN SERPL-MCNC: 22 MG/DL (ref 7–20)
BUN SERPL-MCNC: 23 MG/DL (ref 7–20)
BUN SERPL-MCNC: 23 MG/DL (ref 7–20)
CA-I BLD-SCNC: 0.95 MMOL/L (ref 1.12–1.32)
CA-I BLD-SCNC: 1.22 MMOL/L (ref 1.12–1.32)
CA-I BLD-SCNC: 1.27 MMOL/L (ref 1.12–1.32)
CA-I BLD-SCNC: 1.33 MMOL/L (ref 1.12–1.32)
CALCIUM SERPL-MCNC: 10.5 MG/DL (ref 8.3–10.6)
CALCIUM SERPL-MCNC: 8.3 MG/DL (ref 8.3–10.6)
CALCIUM SERPL-MCNC: 8.3 MG/DL (ref 8.3–10.6)
CALCIUM SERPL-MCNC: 8.4 MG/DL (ref 8.3–10.6)
CALCIUM SERPL-MCNC: 8.5 MG/DL (ref 8.3–10.6)
CALCIUM SERPL-MCNC: 8.6 MG/DL (ref 8.3–10.6)
CALCIUM SERPL-MCNC: 8.8 MG/DL (ref 8.3–10.6)
CALCIUM SERPL-MCNC: 9.2 MG/DL (ref 8.3–10.6)
CALCIUM SERPL-MCNC: 9.2 MG/DL (ref 8.3–10.6)
CALCIUM SERPL-MCNC: 9.3 MG/DL (ref 8.3–10.6)
CALCIUM SERPL-MCNC: 9.3 MG/DL (ref 8.3–10.6)
CALCIUM SERPL-MCNC: 9.7 MG/DL (ref 8.3–10.6)
CALCIUM SERPL-MCNC: 9.7 MG/DL (ref 8.3–10.6)
CELL COUNT FLUID TYPE: NORMAL
CHLORIDE SERPL-SCNC: 100 MMOL/L (ref 99–110)
CHLORIDE SERPL-SCNC: 100 MMOL/L (ref 99–110)
CHLORIDE SERPL-SCNC: 88 MMOL/L (ref 99–110)
CHLORIDE SERPL-SCNC: 89 MMOL/L (ref 99–110)
CHLORIDE SERPL-SCNC: 90 MMOL/L (ref 99–110)
CHLORIDE SERPL-SCNC: 91 MMOL/L (ref 99–110)
CHLORIDE SERPL-SCNC: 92 MMOL/L (ref 99–110)
CHLORIDE SERPL-SCNC: 93 MMOL/L (ref 99–110)
CHLORIDE SERPL-SCNC: 94 MMOL/L (ref 99–110)
CHLORIDE SERPL-SCNC: 96 MMOL/L (ref 99–110)
CHLORIDE SERPL-SCNC: 99 MMOL/L (ref 99–110)
CHOLEST SERPL-MCNC: 124 MG/DL (ref 0–199)
CK SERPL-CCNC: 1008 U/L (ref 39–308)
CLARITY UR: CLEAR
CO2 BLDA-SCNC: 24 MMOL/L
CO2 BLDA-SCNC: 7 MMOL/L
CO2 BLDA-SCNC: 8 MMOL/L
CO2 BLDA-SCNC: <5 MMOL/L
CO2 BLDA-SCNC: ABNORMAL MMOL/L
CO2 SERPL-SCNC: 20 MMOL/L (ref 21–32)
CO2 SERPL-SCNC: 21 MMOL/L (ref 21–32)
CO2 SERPL-SCNC: 23 MMOL/L (ref 21–32)
CO2 SERPL-SCNC: 26 MMOL/L (ref 21–32)
CO2 SERPL-SCNC: 27 MMOL/L (ref 21–32)
CO2 SERPL-SCNC: 27 MMOL/L (ref 21–32)
CO2 SERPL-SCNC: 3 MMOL/L (ref 21–32)
CO2 SERPL-SCNC: 5 MMOL/L (ref 21–32)
CO2 SERPL-SCNC: 5 MMOL/L (ref 21–32)
CO2 SERPL-SCNC: 6 MMOL/L (ref 21–32)
CO2 SERPL-SCNC: 9 MMOL/L (ref 21–32)
COHGB MFR BLDA: 0.6 % (ref 0–1.5)
COHGB MFR BLDA: 1.2 % (ref 0–1.5)
COLOR FLUID: NORMAL
COLOR UR: YELLOW
CREAT SERPL-MCNC: 0.6 MG/DL (ref 0.8–1.3)
CREAT SERPL-MCNC: 0.7 MG/DL (ref 0.8–1.3)
CREAT SERPL-MCNC: 0.8 MG/DL (ref 0.8–1.3)
CREAT SERPL-MCNC: 0.8 MG/DL (ref 0.8–1.3)
CREAT SERPL-MCNC: 0.9 MG/DL (ref 0.8–1.3)
CREAT SERPL-MCNC: 0.9 MG/DL (ref 0.8–1.3)
CREAT SERPL-MCNC: 1.6 MG/DL (ref 0.8–1.3)
CREAT SERPL-MCNC: 1.7 MG/DL (ref 0.8–1.3)
CREAT SERPL-MCNC: 2.1 MG/DL (ref 0.8–1.3)
CREAT SERPL-MCNC: 2.2 MG/DL (ref 0.8–1.3)
CREAT SERPL-MCNC: 2.2 MG/DL (ref 0.8–1.3)
CRP SERPL-MCNC: 20.3 MG/L (ref 0–5.1)
CRYSTALS URNS MICRO: ABNORMAL /HPF
DEPRECATED RDW RBC AUTO: 12.9 % (ref 12.4–15.4)
DEPRECATED RDW RBC AUTO: 13.5 % (ref 12.4–15.4)
DEPRECATED RDW RBC AUTO: 13.6 % (ref 12.4–15.4)
DEPRECATED RDW RBC AUTO: 13.6 % (ref 12.4–15.4)
DEPRECATED RDW RBC AUTO: 13.8 % (ref 12.4–15.4)
DEPRECATED RDW RBC AUTO: 14.2 % (ref 12.4–15.4)
DEPRECATED RDW RBC AUTO: 14.4 % (ref 12.4–15.4)
DEPRECATED RDW RBC AUTO: 14.7 % (ref 12.4–15.4)
DEPRECATED RDW RBC AUTO: 16.8 % (ref 12.4–15.4)
DEPRECATED RDW RBC AUTO: 17 % (ref 12.4–15.4)
EKG ATRIAL RATE: 91 BPM
EKG ATRIAL RATE: 96 BPM
EKG DIAGNOSIS: NORMAL
EKG DIAGNOSIS: NORMAL
EKG P AXIS: 48 DEGREES
EKG P AXIS: 64 DEGREES
EKG P-R INTERVAL: 180 MS
EKG P-R INTERVAL: 186 MS
EKG Q-T INTERVAL: 428 MS
EKG Q-T INTERVAL: 446 MS
EKG QRS DURATION: 148 MS
EKG QRS DURATION: 166 MS
EKG QTC CALCULATION (BAZETT): 540 MS
EKG QTC CALCULATION (BAZETT): 548 MS
EKG R AXIS: -24 DEGREES
EKG R AXIS: -28 DEGREES
EKG T AXIS: 124 DEGREES
EKG T AXIS: 150 DEGREES
EKG VENTRICULAR RATE: 91 BPM
EKG VENTRICULAR RATE: 96 BPM
EOSINOPHIL # BLD: 0 K/UL (ref 0–0.6)
EOSINOPHIL # BLD: 0.1 K/UL (ref 0–0.6)
EOSINOPHIL # BLD: 0.1 K/UL (ref 0–0.6)
EOSINOPHIL # BLD: 0.2 K/UL (ref 0–0.6)
EOSINOPHIL # BLD: 0.2 K/UL (ref 0–0.6)
EOSINOPHIL NFR BLD: 0 %
EOSINOPHIL NFR BLD: 0.1 %
EOSINOPHIL NFR BLD: 0.1 %
EOSINOPHIL NFR BLD: 0.2 %
EOSINOPHIL NFR BLD: 0.4 %
EOSINOPHIL NFR BLD: 0.7 %
EOSINOPHIL NFR BLD: 1.1 %
EOSINOPHIL NFR BLD: 2.1 %
EOSINOPHIL NFR BLD: 2.7 %
EPI CELLS #/AREA URNS HPF: ABNORMAL /HPF (ref 0–5)
ERYTHROCYTE [SEDIMENTATION RATE] IN BLOOD BY WESTERGREN METHOD: 5 MM/HR (ref 0–20)
EST. AVERAGE GLUCOSE BLD GHB EST-MCNC: 131.2 MG/DL
GFR SERPLBLD CREATININE-BSD FMLA CKD-EPI: 31 ML/MIN/{1.73_M2}
GFR SERPLBLD CREATININE-BSD FMLA CKD-EPI: 31 ML/MIN/{1.73_M2}
GFR SERPLBLD CREATININE-BSD FMLA CKD-EPI: 33 ML/MIN/{1.73_M2}
GFR SERPLBLD CREATININE-BSD FMLA CKD-EPI: 43 ML/MIN/{1.73_M2}
GFR SERPLBLD CREATININE-BSD FMLA CKD-EPI: 46 ML/MIN/{1.73_M2}
GFR SERPLBLD CREATININE-BSD FMLA CKD-EPI: >60 ML/MIN/{1.73_M2}
GLUCOSE BLD-MCNC: 110 MG/DL (ref 70–99)
GLUCOSE BLD-MCNC: 125 MG/DL (ref 70–99)
GLUCOSE BLD-MCNC: 212 MG/DL (ref 70–99)
GLUCOSE BLD-MCNC: 248 MG/DL (ref 70–99)
GLUCOSE BLD-MCNC: 51 MG/DL (ref 70–99)
GLUCOSE BLD-MCNC: 80 MG/DL (ref 70–99)
GLUCOSE BLD-MCNC: 88 MG/DL (ref 70–99)
GLUCOSE BLD-MCNC: <20 MG/DL (ref 70–99)
GLUCOSE FLD-MCNC: 128 MG/DL
GLUCOSE P FAST SERPL-MCNC: 129 MG/DL (ref 70–99)
GLUCOSE SERPL-MCNC: 116 MG/DL (ref 70–99)
GLUCOSE SERPL-MCNC: 117 MG/DL (ref 70–99)
GLUCOSE SERPL-MCNC: 119 MG/DL (ref 70–99)
GLUCOSE SERPL-MCNC: 119 MG/DL (ref 70–99)
GLUCOSE SERPL-MCNC: 132 MG/DL (ref 70–99)
GLUCOSE SERPL-MCNC: 143 MG/DL (ref 70–99)
GLUCOSE SERPL-MCNC: 179 MG/DL (ref 70–99)
GLUCOSE SERPL-MCNC: 197 MG/DL (ref 70–99)
GLUCOSE SERPL-MCNC: 38 MG/DL (ref 70–99)
GLUCOSE SERPL-MCNC: 80 MG/DL (ref 70–99)
GLUCOSE SERPL-MCNC: 97 MG/DL (ref 70–99)
GLUCOSE SERPL-MCNC: 98 MG/DL (ref 70–99)
GLUCOSE UR STRIP.AUTO-MCNC: NEGATIVE MG/DL
HBA1C MFR BLD: 6.2 %
HCO3 BLDA-SCNC: 2.4 MMOL/L (ref 21–29)
HCO3 BLDA-SCNC: 23 MMOL/L (ref 21–29)
HCO3 BLDA-SCNC: 3.9 MMOL/L (ref 21–29)
HCO3 BLDA-SCNC: 4.1 MMOL/L (ref 21–29)
HCO3 BLDA-SCNC: 6.1 MMOL/L (ref 21–29)
HCO3 BLDA-SCNC: 7.3 MMOL/L (ref 21–29)
HCO3 BLDA-SCNC: ABNORMAL MMOL/L (ref 21–29)
HCT VFR BLD AUTO: 33 % (ref 40.5–52.5)
HCT VFR BLD AUTO: 34.9 % (ref 40.5–52.5)
HCT VFR BLD AUTO: 37.1 % (ref 40.5–52.5)
HCT VFR BLD AUTO: 37.5 % (ref 40.5–52.5)
HCT VFR BLD AUTO: 37.7 % (ref 40.5–52.5)
HCT VFR BLD AUTO: 40.6 % (ref 40.5–52.5)
HCT VFR BLD AUTO: 43.7 % (ref 40.5–52.5)
HCT VFR BLD AUTO: 44.3 % (ref 40.5–52.5)
HCT VFR BLD AUTO: 45.6 % (ref 40.5–52.5)
HCT VFR BLD AUTO: 46.7 % (ref 40.5–52.5)
HDLC SERPL-MCNC: 26 MG/DL (ref 40–60)
HGB BLD-MCNC: 10.2 G/DL (ref 13.5–17.5)
HGB BLD-MCNC: 11.5 G/DL (ref 13.5–17.5)
HGB BLD-MCNC: 12 G/DL (ref 13.5–17.5)
HGB BLD-MCNC: 12.8 G/DL (ref 13.5–17.5)
HGB BLD-MCNC: 13.1 G/DL (ref 13.5–17.5)
HGB BLD-MCNC: 13.6 G/DL (ref 13.5–17.5)
HGB BLD-MCNC: 14.6 G/DL (ref 13.5–17.5)
HGB BLD-MCNC: 15 G/DL (ref 13.5–17.5)
HGB BLD-MCNC: 15.7 G/DL (ref 13.5–17.5)
HGB BLD-MCNC: 16.1 G/DL (ref 13.5–17.5)
HGB BLDA-MCNC: 11.8 G/DL
HGB BLDA-MCNC: 15.4 G/DL
HGB UR QL STRIP.AUTO: NEGATIVE
INR PPP: 1.27 (ref 0.84–1.16)
INR PPP: 1.36 (ref 0.84–1.16)
INR PPP: 5.83 (ref 0.84–1.16)
KETONES UR STRIP.AUTO-MCNC: NEGATIVE MG/DL
LACTATE BLD-SCNC: 13.88 MMOL/L (ref 0.4–2)
LACTATE BLD-SCNC: >20 MMOL/L (ref 0.4–2)
LACTATE BLDV-SCNC: 1.4 MMOL/L (ref 0.4–2)
LACTATE BLDV-SCNC: 1.5 MMOL/L (ref 0.4–2)
LACTATE BLDV-SCNC: 1.8 MMOL/L (ref 0.4–2)
LACTATE BLDV-SCNC: 21.8 MMOL/L (ref 0.4–2)
LACTATE BLDV-SCNC: 22.6 MMOL/L (ref 0.4–2)
LACTATE BLDV-SCNC: 22.7 MMOL/L (ref 0.4–2)
LACTATE BLDV-SCNC: 24.4 MMOL/L (ref 0.4–2)
LACTATE BLDV-SCNC: 24.6 MMOL/L (ref 0.4–2)
LACTATE BLDV-SCNC: 3.4 MMOL/L (ref 0.4–2)
LDH FLD L TO P-CCNC: 119 U/L
LDL CHOLESTEROL CALCULATED: 81 MG/DL
LEUKOCYTE ESTERASE UR QL STRIP.AUTO: NEGATIVE
LIPASE SERPL-CCNC: 35 U/L (ref 13–60)
LIPASE SERPL-CCNC: 44 U/L (ref 13–60)
LIPASE SERPL-CCNC: 84 U/L (ref 13–60)
LV EF: 43 %
LVEF MODALITY: NORMAL
LYMPHOCYTES # BLD: 1.3 K/UL (ref 1–5.1)
LYMPHOCYTES # BLD: 1.3 K/UL (ref 1–5.1)
LYMPHOCYTES # BLD: 1.4 K/UL (ref 1–5.1)
LYMPHOCYTES # BLD: 1.4 K/UL (ref 1–5.1)
LYMPHOCYTES # BLD: 1.6 K/UL (ref 1–5.1)
LYMPHOCYTES # BLD: 1.8 K/UL (ref 1–5.1)
LYMPHOCYTES # BLD: 2 K/UL (ref 1–5.1)
LYMPHOCYTES # BLD: 2 K/UL (ref 1–5.1)
LYMPHOCYTES # BLD: 3.2 K/UL (ref 1–5.1)
LYMPHOCYTES NFR BLD: 10.4 %
LYMPHOCYTES NFR BLD: 13.5 %
LYMPHOCYTES NFR BLD: 14.1 %
LYMPHOCYTES NFR BLD: 18.4 %
LYMPHOCYTES NFR BLD: 19.4 %
LYMPHOCYTES NFR BLD: 20.7 %
LYMPHOCYTES NFR BLD: 24.5 %
LYMPHOCYTES NFR BLD: 31.7 %
LYMPHOCYTES NFR BLD: 5 %
LYMPHOCYTES NFR FLD: 48 %
MACROPHAGES # FLD: 3 %
MAGNESIUM SERPL-MCNC: 1.8 MG/DL (ref 1.8–2.4)
MAGNESIUM SERPL-MCNC: 1.8 MG/DL (ref 1.8–2.4)
MAGNESIUM SERPL-MCNC: 1.9 MG/DL (ref 1.8–2.4)
MAGNESIUM SERPL-MCNC: 1.9 MG/DL (ref 1.8–2.4)
MAGNESIUM SERPL-MCNC: 2 MG/DL (ref 1.8–2.4)
MAGNESIUM SERPL-MCNC: 2.4 MG/DL (ref 1.8–2.4)
MCH RBC QN AUTO: 32 PG (ref 26–34)
MCH RBC QN AUTO: 32.2 PG (ref 26–34)
MCH RBC QN AUTO: 32.3 PG (ref 26–34)
MCH RBC QN AUTO: 32.6 PG (ref 26–34)
MCH RBC QN AUTO: 32.7 PG (ref 26–34)
MCH RBC QN AUTO: 32.9 PG (ref 26–34)
MCH RBC QN AUTO: 33 PG (ref 26–34)
MCH RBC QN AUTO: 33.2 PG (ref 26–34)
MCH RBC QN AUTO: 33.2 PG (ref 26–34)
MCH RBC QN AUTO: 34.3 PG (ref 26–34)
MCHC RBC AUTO-ENTMCNC: 30.7 G/DL (ref 31–36)
MCHC RBC AUTO-ENTMCNC: 30.7 G/DL (ref 31–36)
MCHC RBC AUTO-ENTMCNC: 33.1 G/DL (ref 31–36)
MCHC RBC AUTO-ENTMCNC: 33.5 G/DL (ref 31–36)
MCHC RBC AUTO-ENTMCNC: 34.4 G/DL (ref 31–36)
MCHC RBC AUTO-ENTMCNC: 34.5 G/DL (ref 31–36)
MCHC RBC AUTO-ENTMCNC: 34.6 G/DL (ref 31–36)
MCHC RBC AUTO-ENTMCNC: 34.9 G/DL (ref 31–36)
MCV RBC AUTO: 106 FL (ref 80–100)
MCV RBC AUTO: 107.5 FL (ref 80–100)
MCV RBC AUTO: 93.6 FL (ref 80–100)
MCV RBC AUTO: 93.7 FL (ref 80–100)
MCV RBC AUTO: 95.1 FL (ref 80–100)
MCV RBC AUTO: 95.4 FL (ref 80–100)
MCV RBC AUTO: 96 FL (ref 80–100)
MCV RBC AUTO: 96.9 FL (ref 80–100)
MCV RBC AUTO: 97.7 FL (ref 80–100)
MCV RBC AUTO: 99.3 FL (ref 80–100)
METHGB MFR BLDA: 0 % (ref 0–1.4)
METHGB MFR BLDA: 0.3 % (ref 0–1.4)
MONOCYTES # BLD: 0.8 K/UL (ref 0–1.3)
MONOCYTES # BLD: 0.8 K/UL (ref 0–1.3)
MONOCYTES # BLD: 0.9 K/UL (ref 0–1.3)
MONOCYTES # BLD: 1 K/UL (ref 0–1.3)
MONOCYTES # BLD: 1.2 K/UL (ref 0–1.3)
MONOCYTES # BLD: 1.3 K/UL (ref 0–1.3)
MONOCYTES # BLD: 1.6 K/UL (ref 0–1.3)
MONOCYTES NFR BLD: 10 %
MONOCYTES NFR BLD: 11.5 %
MONOCYTES NFR BLD: 11.9 %
MONOCYTES NFR BLD: 13.3 %
MONOCYTES NFR BLD: 13.6 %
MONOCYTES NFR BLD: 5 %
MONOCYTES NFR BLD: 6.8 %
MONOCYTES NFR BLD: 8.7 %
MONOCYTES NFR BLD: 8.8 %
MONOCYTES NFR FLD: 31 %
MYELOCYTES NFR BLD MANUAL: 1 %
NEUTROPHIL, FLUID: 18 %
NEUTROPHILS # BLD: 11 K/UL (ref 1.7–7.7)
NEUTROPHILS # BLD: 11.3 K/UL (ref 1.7–7.7)
NEUTROPHILS # BLD: 28.1 K/UL (ref 1.7–7.7)
NEUTROPHILS # BLD: 3.8 K/UL (ref 1.7–7.7)
NEUTROPHILS # BLD: 4.8 K/UL (ref 1.7–7.7)
NEUTROPHILS # BLD: 5 K/UL (ref 1.7–7.7)
NEUTROPHILS # BLD: 6 K/UL (ref 1.7–7.7)
NEUTROPHILS # BLD: 6.4 K/UL (ref 1.7–7.7)
NEUTROPHILS # BLD: 7.2 K/UL (ref 1.7–7.7)
NEUTROPHILS NFR BLD: 58.7 %
NEUTROPHILS NFR BLD: 61.7 %
NEUTROPHILS NFR BLD: 62.7 %
NEUTROPHILS NFR BLD: 66 %
NEUTROPHILS NFR BLD: 66.5 %
NEUTROPHILS NFR BLD: 75.3 %
NEUTROPHILS NFR BLD: 78.5 %
NEUTROPHILS NFR BLD: 80.3 %
NEUTROPHILS NFR BLD: 88 %
NEUTS BAND NFR BLD MANUAL: 1 % (ref 0–7)
NITRITE UR QL STRIP.AUTO: NEGATIVE
NUC CELL # FLD: 1217 /CUMM
PCO2 BLDA: 13.1 MM HG (ref 35–45)
PCO2 BLDA: 13.2 MM HG (ref 35–45)
PCO2 BLDA: 13.7 MM HG (ref 35–45)
PCO2 BLDA: 17.3 MM HG (ref 35–45)
PCO2 BLDA: 23.7 MM HG (ref 35–45)
PCO2 BLDA: 31.8 MMHG (ref 35–45)
PCO2 BLDA: <15 MMHG (ref 35–45)
PERFORMED ON: ABNORMAL
PERFORMED ON: NORMAL
PH BLDA: 6.84 [PH] (ref 7.35–7.45)
PH BLDA: 6.86 [PH] (ref 7.35–7.45)
PH BLDA: 7.05 [PH] (ref 7.35–7.45)
PH BLDA: 7.08 [PH] (ref 7.35–7.45)
PH BLDA: 7.23 [PH] (ref 7.35–7.45)
PH BLDA: 7.27 [PH] (ref 7.35–7.45)
PH BLDA: 7.46 [PH] (ref 7.35–7.45)
PH UR STRIP.AUTO: 5.5 [PH] (ref 5–8)
PH VENOUS: 7.21 (ref 7.35–7.45)
PHOSPHATE SERPL-MCNC: 6.4 MG/DL (ref 2.5–4.9)
PHOSPHATE SERPL-MCNC: 6.7 MG/DL (ref 2.5–4.9)
PHOSPHATE SERPL-MCNC: 9.4 MG/DL (ref 2.5–4.9)
PLATELET # BLD AUTO: 179 K/UL (ref 135–450)
PLATELET # BLD AUTO: 219 K/UL (ref 135–450)
PLATELET # BLD AUTO: 240 K/UL (ref 135–450)
PLATELET # BLD AUTO: 246 K/UL (ref 135–450)
PLATELET # BLD AUTO: 256 K/UL (ref 135–450)
PLATELET # BLD AUTO: 257 K/UL (ref 135–450)
PLATELET # BLD AUTO: 271 K/UL (ref 135–450)
PLATELET # BLD AUTO: 271 K/UL (ref 135–450)
PLATELET # BLD AUTO: 321 K/UL (ref 135–450)
PLATELET # BLD AUTO: 417 K/UL (ref 135–450)
PLATELET BLD QL SMEAR: ADEQUATE
PMV BLD AUTO: 7.2 FL (ref 5–10.5)
PMV BLD AUTO: 7.3 FL (ref 5–10.5)
PMV BLD AUTO: 7.4 FL (ref 5–10.5)
PMV BLD AUTO: 7.6 FL (ref 5–10.5)
PMV BLD AUTO: 7.6 FL (ref 5–10.5)
PMV BLD AUTO: 7.8 FL (ref 5–10.5)
PMV BLD AUTO: 8 FL (ref 5–10.5)
PMV BLD AUTO: 8.1 FL (ref 5–10.5)
PMV BLD AUTO: 8.5 FL (ref 5–10.5)
PMV BLD AUTO: 8.6 FL (ref 5–10.5)
PO2 BLDA: 148.4 MM HG (ref 75–108)
PO2 BLDA: 184.7 MM HG (ref 75–108)
PO2 BLDA: 342 MMHG (ref 75–108)
PO2 BLDA: 359.3 MM HG (ref 75–108)
PO2 BLDA: 387 MM HG (ref 75–108)
PO2 BLDA: 442.4 MM HG (ref 75–108)
PO2 BLDA: 62.5 MMHG (ref 75–108)
POC SAMPLE TYPE: ABNORMAL
POTASSIUM BLD-SCNC: 5.3 MMOL/L (ref 3.5–5.1)
POTASSIUM BLD-SCNC: 5.8 MMOL/L (ref 3.5–5.1)
POTASSIUM BLD-SCNC: 6.5 MMOL/L (ref 3.5–5.1)
POTASSIUM SERPL-SCNC: 3 MMOL/L (ref 3.5–5.1)
POTASSIUM SERPL-SCNC: 3.7 MMOL/L (ref 3.5–5.1)
POTASSIUM SERPL-SCNC: 3.7 MMOL/L (ref 3.5–5.1)
POTASSIUM SERPL-SCNC: 4 MMOL/L (ref 3.5–5.1)
POTASSIUM SERPL-SCNC: 4.1 MMOL/L (ref 3.5–5.1)
POTASSIUM SERPL-SCNC: 4.3 MMOL/L (ref 3.5–5.1)
POTASSIUM SERPL-SCNC: 4.5 MMOL/L (ref 3.5–5.1)
POTASSIUM SERPL-SCNC: 4.6 MMOL/L (ref 3.5–5.1)
POTASSIUM SERPL-SCNC: 4.9 MMOL/L (ref 3.5–5.1)
POTASSIUM SERPL-SCNC: 5 MMOL/L (ref 3.5–5.1)
POTASSIUM SERPL-SCNC: 5.2 MMOL/L (ref 3.5–5.1)
POTASSIUM SERPL-SCNC: 5.2 MMOL/L (ref 3.5–5.1)
POTASSIUM SERPL-SCNC: 6.8 MMOL/L (ref 3.5–5.1)
PROT SERPL-MCNC: 5.2 G/DL (ref 6.4–8.2)
PROT SERPL-MCNC: 5.6 G/DL (ref 6.4–8.2)
PROT SERPL-MCNC: 6 G/DL (ref 6.4–8.2)
PROT SERPL-MCNC: 6.6 G/DL (ref 6.4–8.2)
PROT SERPL-MCNC: 6.8 G/DL (ref 6.4–8.2)
PROT SERPL-MCNC: 7.1 G/DL (ref 6.4–8.2)
PROT SERPL-MCNC: 7.3 G/DL (ref 6.4–8.2)
PROT SERPL-MCNC: 7.3 G/DL (ref 6.4–8.2)
PROT SERPL-MCNC: 7.6 G/DL (ref 6.4–8.2)
PROT UR STRIP.AUTO-MCNC: 30 MG/DL
PROTHROMBIN TIME: 15.9 SEC (ref 11.5–14.8)
PROTHROMBIN TIME: 16.7 SEC (ref 11.5–14.8)
PROTHROMBIN TIME: 51.7 SEC (ref 11.5–14.8)
PSA SERPL DL<=0.01 NG/ML-MCNC: 1.61 NG/ML (ref 0–4)
RBC # BLD AUTO: 3.12 M/UL (ref 4.2–5.9)
RBC # BLD AUTO: 3.49 M/UL (ref 4.2–5.9)
RBC # BLD AUTO: 3.73 M/UL (ref 4.2–5.9)
RBC # BLD AUTO: 3.9 M/UL (ref 4.2–5.9)
RBC # BLD AUTO: 3.95 M/UL (ref 4.2–5.9)
RBC # BLD AUTO: 4.16 M/UL (ref 4.2–5.9)
RBC # BLD AUTO: 4.57 M/UL (ref 4.2–5.9)
RBC # BLD AUTO: 4.59 M/UL (ref 4.2–5.9)
RBC # BLD AUTO: 4.66 M/UL (ref 4.2–5.9)
RBC # BLD AUTO: 4.86 M/UL (ref 4.2–5.9)
RBC #/AREA URNS HPF: ABNORMAL /HPF (ref 0–4)
RBC FLUID: 1200 /CUMM
RBC MORPH BLD: NORMAL
RENAL EPI CELLS #/AREA UR COMP ASSIST: ABNORMAL /HPF (ref 0–1)
SAO2 % BLDA: 100 % (ref 93–100)
SAO2 % BLDA: 93 % (ref 93–100)
SAO2 % BLDA: 98 % (ref 93–100)
SAO2 % BLDA: 98 % (ref 93–100)
SAO2 % BLDA: >100 % (ref 93–100)
SLIDE REVIEW: ABNORMAL
SODIUM BLD-SCNC: 129 MMOL/L (ref 136–145)
SODIUM BLD-SCNC: 130 MMOL/L (ref 136–145)
SODIUM BLD-SCNC: 131 MMOL/L (ref 136–145)
SODIUM SERPL-SCNC: 126 MMOL/L (ref 136–145)
SODIUM SERPL-SCNC: 129 MMOL/L (ref 136–145)
SODIUM SERPL-SCNC: 130 MMOL/L (ref 136–145)
SODIUM SERPL-SCNC: 132 MMOL/L (ref 136–145)
SODIUM SERPL-SCNC: 133 MMOL/L (ref 136–145)
SODIUM SERPL-SCNC: 133 MMOL/L (ref 136–145)
SODIUM SERPL-SCNC: 134 MMOL/L (ref 136–145)
SODIUM SERPL-SCNC: 137 MMOL/L (ref 136–145)
SP GR UR STRIP.AUTO: 1.01 (ref 1–1.03)
SPECIMEN SOURCE FLD: NORMAL
TOTAL CELLS COUNTED FLD: 100
TRIGL SERPL-MCNC: 84 MG/DL (ref 0–150)
UA COMPLETE W REFLEX CULTURE PNL UR: ABNORMAL
UA DIPSTICK W REFLEX MICRO PNL UR: YES
URN SPEC COLLECT METH UR: ABNORMAL
UROBILINOGEN UR STRIP-ACNC: 2 E.U./DL
VLDLC SERPL CALC-MCNC: 17 MG/DL
WBC # BLD AUTO: 10.2 K/UL (ref 4–11)
WBC # BLD AUTO: 13.6 K/UL (ref 4–11)
WBC # BLD AUTO: 14.4 K/UL (ref 4–11)
WBC # BLD AUTO: 28.9 K/UL (ref 4–11)
WBC # BLD AUTO: 31.2 K/UL (ref 4–11)
WBC # BLD AUTO: 6.1 K/UL (ref 4–11)
WBC # BLD AUTO: 7.3 K/UL (ref 4–11)
WBC # BLD AUTO: 8 K/UL (ref 4–11)
WBC # BLD AUTO: 9.6 K/UL (ref 4–11)
WBC # BLD AUTO: 9.6 K/UL (ref 4–11)
WBC #/AREA URNS HPF: ABNORMAL /HPF (ref 0–5)

## 2023-01-01 PROCEDURE — 83690 ASSAY OF LIPASE: CPT

## 2023-01-01 PROCEDURE — 6360000002 HC RX W HCPCS: Performed by: STUDENT IN AN ORGANIZED HEALTH CARE EDUCATION/TRAINING PROGRAM

## 2023-01-01 PROCEDURE — 85025 COMPLETE CBC W/AUTO DIFF WBC: CPT

## 2023-01-01 PROCEDURE — 83615 LACTATE (LD) (LDH) ENZYME: CPT

## 2023-01-01 PROCEDURE — 82330 ASSAY OF CALCIUM: CPT

## 2023-01-01 PROCEDURE — 89051 BODY FLUID CELL COUNT: CPT

## 2023-01-01 PROCEDURE — 6360000004 HC RX CONTRAST MEDICATION: Performed by: STUDENT IN AN ORGANIZED HEALTH CARE EDUCATION/TRAINING PROGRAM

## 2023-01-01 PROCEDURE — 0DB68ZX EXCISION OF STOMACH, VIA NATURAL OR ARTIFICIAL OPENING ENDOSCOPIC, DIAGNOSTIC: ICD-10-PCS | Performed by: INTERNAL MEDICINE

## 2023-01-01 PROCEDURE — 83735 ASSAY OF MAGNESIUM: CPT

## 2023-01-01 PROCEDURE — 71045 X-RAY EXAM CHEST 1 VIEW: CPT

## 2023-01-01 PROCEDURE — 2500000003 HC RX 250 WO HCPCS: Performed by: INTERNAL MEDICINE

## 2023-01-01 PROCEDURE — 6360000002 HC RX W HCPCS

## 2023-01-01 PROCEDURE — 2580000003 HC RX 258: Performed by: STUDENT IN AN ORGANIZED HEALTH CARE EDUCATION/TRAINING PROGRAM

## 2023-01-01 PROCEDURE — 84295 ASSAY OF SERUM SODIUM: CPT

## 2023-01-01 PROCEDURE — 36415 COLL VENOUS BLD VENIPUNCTURE: CPT

## 2023-01-01 PROCEDURE — 2500000003 HC RX 250 WO HCPCS

## 2023-01-01 PROCEDURE — 82803 BLOOD GASES ANY COMBINATION: CPT

## 2023-01-01 PROCEDURE — 2580000003 HC RX 258: Performed by: INTERNAL MEDICINE

## 2023-01-01 PROCEDURE — 80076 HEPATIC FUNCTION PANEL: CPT

## 2023-01-01 PROCEDURE — 84100 ASSAY OF PHOSPHORUS: CPT

## 2023-01-01 PROCEDURE — 49083 ABD PARACENTESIS W/IMAGING: CPT

## 2023-01-01 PROCEDURE — C9113 INJ PANTOPRAZOLE SODIUM, VIA: HCPCS | Performed by: STUDENT IN AN ORGANIZED HEALTH CARE EDUCATION/TRAINING PROGRAM

## 2023-01-01 PROCEDURE — 2500000003 HC RX 250 WO HCPCS: Performed by: STUDENT IN AN ORGANIZED HEALTH CARE EDUCATION/TRAINING PROGRAM

## 2023-01-01 PROCEDURE — P9047 ALBUMIN (HUMAN), 25%, 50ML: HCPCS | Performed by: INTERNAL MEDICINE

## 2023-01-01 PROCEDURE — 87040 BLOOD CULTURE FOR BACTERIA: CPT

## 2023-01-01 PROCEDURE — 6360000002 HC RX W HCPCS: Performed by: INTERNAL MEDICINE

## 2023-01-01 PROCEDURE — 2580000003 HC RX 258

## 2023-01-01 PROCEDURE — P9047 ALBUMIN (HUMAN), 25%, 50ML: HCPCS | Performed by: STUDENT IN AN ORGANIZED HEALTH CARE EDUCATION/TRAINING PROGRAM

## 2023-01-01 PROCEDURE — 2580000003 HC RX 258: Performed by: ANESTHESIOLOGY

## 2023-01-01 PROCEDURE — 6360000002 HC RX W HCPCS: Performed by: NURSE ANESTHETIST, CERTIFIED REGISTERED

## 2023-01-01 PROCEDURE — 2700000000 HC OXYGEN THERAPY PER DAY

## 2023-01-01 PROCEDURE — 3700000000 HC ANESTHESIA ATTENDED CARE: Performed by: INTERNAL MEDICINE

## 2023-01-01 PROCEDURE — 85652 RBC SED RATE AUTOMATED: CPT

## 2023-01-01 PROCEDURE — 2060000000 HC ICU INTERMEDIATE R&B

## 2023-01-01 PROCEDURE — 81001 URINALYSIS AUTO W/SCOPE: CPT

## 2023-01-01 PROCEDURE — 83605 ASSAY OF LACTIC ACID: CPT

## 2023-01-01 PROCEDURE — 3074F SYST BP LT 130 MM HG: CPT | Performed by: FAMILY MEDICINE

## 2023-01-01 PROCEDURE — 31500 INSERT EMERGENCY AIRWAY: CPT

## 2023-01-01 PROCEDURE — 99232 SBSQ HOSP IP/OBS MODERATE 35: CPT | Performed by: SURGERY

## 2023-01-01 PROCEDURE — 99223 1ST HOSP IP/OBS HIGH 75: CPT | Performed by: SURGERY

## 2023-01-01 PROCEDURE — 2580000003 HC RX 258: Performed by: NURSE ANESTHETIST, CERTIFIED REGISTERED

## 2023-01-01 PROCEDURE — 6360000002 HC RX W HCPCS: Performed by: ANESTHESIOLOGY

## 2023-01-01 PROCEDURE — 1200000000 HC SEMI PRIVATE

## 2023-01-01 PROCEDURE — 94660 CPAP INITIATION&MGMT: CPT

## 2023-01-01 PROCEDURE — 2500000003 HC RX 250 WO HCPCS: Performed by: NURSE ANESTHETIST, CERTIFIED REGISTERED

## 2023-01-01 PROCEDURE — 93005 ELECTROCARDIOGRAM TRACING: CPT | Performed by: INTERNAL MEDICINE

## 2023-01-01 PROCEDURE — 80053 COMPREHEN METABOLIC PANEL: CPT

## 2023-01-01 PROCEDURE — 85610 PROTHROMBIN TIME: CPT

## 2023-01-01 PROCEDURE — 75635 CT ANGIO ABDOMINAL ARTERIES: CPT

## 2023-01-01 PROCEDURE — 6360000004 HC RX CONTRAST MEDICATION: Performed by: SURGERY

## 2023-01-01 PROCEDURE — 99285 EMERGENCY DEPT VISIT HI MDM: CPT

## 2023-01-01 PROCEDURE — 5A1935Z RESPIRATORY VENTILATION, LESS THAN 24 CONSECUTIVE HOURS: ICD-10-PCS | Performed by: INTERNAL MEDICINE

## 2023-01-01 PROCEDURE — 90945 DIALYSIS ONE EVALUATION: CPT

## 2023-01-01 PROCEDURE — G8417 CALC BMI ABV UP PARAM F/U: HCPCS | Performed by: FAMILY MEDICINE

## 2023-01-01 PROCEDURE — 82042 OTHER SOURCE ALBUMIN QUAN EA: CPT

## 2023-01-01 PROCEDURE — B42H1ZZ COMPUTERIZED TOMOGRAPHY (CT SCAN) OF BILATERAL LOWER EXTREMITY ARTERIES USING LOW OSMOLAR CONTRAST: ICD-10-PCS | Performed by: RADIOLOGY

## 2023-01-01 PROCEDURE — 82947 ASSAY GLUCOSE BLOOD QUANT: CPT

## 2023-01-01 PROCEDURE — 80048 BASIC METABOLIC PNL TOTAL CA: CPT

## 2023-01-01 PROCEDURE — 6370000000 HC RX 637 (ALT 250 FOR IP)

## 2023-01-01 PROCEDURE — 94761 N-INVAS EAR/PLS OXIMETRY MLT: CPT

## 2023-01-01 PROCEDURE — 1036F TOBACCO NON-USER: CPT | Performed by: FAMILY MEDICINE

## 2023-01-01 PROCEDURE — 36592 COLLECT BLOOD FROM PICC: CPT

## 2023-01-01 PROCEDURE — 88305 TISSUE EXAM BY PATHOLOGIST: CPT

## 2023-01-01 PROCEDURE — 3700000001 HC ADD 15 MINUTES (ANESTHESIA): Performed by: INTERNAL MEDICINE

## 2023-01-01 PROCEDURE — 7100000001 HC PACU RECOVERY - ADDTL 15 MIN: Performed by: INTERNAL MEDICINE

## 2023-01-01 PROCEDURE — C8929 TTE W OR WO FOL WCON,DOPPLER: HCPCS

## 2023-01-01 PROCEDURE — 74240 X-RAY XM UPR GI TRC 1CNTRST: CPT

## 2023-01-01 PROCEDURE — 82945 GLUCOSE OTHER FLUID: CPT

## 2023-01-01 PROCEDURE — 02HV33Z INSERTION OF INFUSION DEVICE INTO SUPERIOR VENA CAVA, PERCUTANEOUS APPROACH: ICD-10-PCS | Performed by: INTERNAL MEDICINE

## 2023-01-01 PROCEDURE — 86140 C-REACTIVE PROTEIN: CPT

## 2023-01-01 PROCEDURE — 3017F COLORECTAL CA SCREEN DOC REV: CPT | Performed by: FAMILY MEDICINE

## 2023-01-01 PROCEDURE — 87070 CULTURE OTHR SPECIMN AEROBIC: CPT

## 2023-01-01 PROCEDURE — 90945 DIALYSIS ONE EVALUATION: CPT | Performed by: INTERNAL MEDICINE

## 2023-01-01 PROCEDURE — 99231 SBSQ HOSP IP/OBS SF/LOW 25: CPT | Performed by: SURGERY

## 2023-01-01 PROCEDURE — 99223 1ST HOSP IP/OBS HIGH 75: CPT | Performed by: INTERNAL MEDICINE

## 2023-01-01 PROCEDURE — 99214 OFFICE O/P EST MOD 30 MIN: CPT | Performed by: FAMILY MEDICINE

## 2023-01-01 PROCEDURE — 94002 VENT MGMT INPAT INIT DAY: CPT

## 2023-01-01 PROCEDURE — B543ZZA ULTRASONOGRAPHY OF RIGHT JUGULAR VEINS, GUIDANCE: ICD-10-PCS | Performed by: SURGERY

## 2023-01-01 PROCEDURE — 36600 WITHDRAWAL OF ARTERIAL BLOOD: CPT

## 2023-01-01 PROCEDURE — 36556 INSERT NON-TUNNEL CV CATH: CPT

## 2023-01-01 PROCEDURE — 94003 VENT MGMT INPAT SUBQ DAY: CPT

## 2023-01-01 PROCEDURE — 99223 1ST HOSP IP/OBS HIGH 75: CPT | Performed by: NURSE PRACTITIONER

## 2023-01-01 PROCEDURE — 3609012400 HC EGD TRANSORAL BIOPSY SINGLE/MULTIPLE: Performed by: INTERNAL MEDICINE

## 2023-01-01 PROCEDURE — 85027 COMPLETE CBC AUTOMATED: CPT

## 2023-01-01 PROCEDURE — A4216 STERILE WATER/SALINE, 10 ML: HCPCS

## 2023-01-01 PROCEDURE — 2709999900 HC NON-CHARGEABLE SUPPLY: Performed by: INTERNAL MEDICINE

## 2023-01-01 PROCEDURE — 6360000002 HC RX W HCPCS: Performed by: SURGERY

## 2023-01-01 PROCEDURE — 3078F DIAST BP <80 MM HG: CPT | Performed by: FAMILY MEDICINE

## 2023-01-01 PROCEDURE — 02HV33Z INSERTION OF INFUSION DEVICE INTO SUPERIOR VENA CAVA, PERCUTANEOUS APPROACH: ICD-10-PCS | Performed by: SURGERY

## 2023-01-01 PROCEDURE — B4201ZZ COMPUTERIZED TOMOGRAPHY (CT SCAN) OF ABDOMINAL AORTA USING LOW OSMOLAR CONTRAST: ICD-10-PCS | Performed by: RADIOLOGY

## 2023-01-01 PROCEDURE — G8427 DOCREV CUR MEDS BY ELIG CLIN: HCPCS | Performed by: FAMILY MEDICINE

## 2023-01-01 PROCEDURE — 0W9G3ZZ DRAINAGE OF PERITONEAL CAVITY, PERCUTANEOUS APPROACH: ICD-10-PCS | Performed by: STUDENT IN AN ORGANIZED HEALTH CARE EDUCATION/TRAINING PROGRAM

## 2023-01-01 PROCEDURE — 03HY32Z INSERTION OF MONITORING DEVICE INTO UPPER ARTERY, PERCUTANEOUS APPROACH: ICD-10-PCS | Performed by: INTERNAL MEDICINE

## 2023-01-01 PROCEDURE — 2000000000 HC ICU R&B

## 2023-01-01 PROCEDURE — 1123F ACP DISCUSS/DSCN MKR DOCD: CPT | Performed by: FAMILY MEDICINE

## 2023-01-01 PROCEDURE — 7100000000 HC PACU RECOVERY - FIRST 15 MIN: Performed by: INTERNAL MEDICINE

## 2023-01-01 PROCEDURE — 93005 ELECTROCARDIOGRAM TRACING: CPT | Performed by: STUDENT IN AN ORGANIZED HEALTH CARE EDUCATION/TRAINING PROGRAM

## 2023-01-01 PROCEDURE — 82550 ASSAY OF CK (CPK): CPT

## 2023-01-01 PROCEDURE — 84132 ASSAY OF SERUM POTASSIUM: CPT

## 2023-01-01 PROCEDURE — 99291 CRITICAL CARE FIRST HOUR: CPT | Performed by: INTERNAL MEDICINE

## 2023-01-01 PROCEDURE — B544ZZA ULTRASONOGRAPHY OF LEFT JUGULAR VEINS, GUIDANCE: ICD-10-PCS | Performed by: INTERNAL MEDICINE

## 2023-01-01 PROCEDURE — 0BH17EZ INSERTION OF ENDOTRACHEAL AIRWAY INTO TRACHEA, VIA NATURAL OR ARTIFICIAL OPENING: ICD-10-PCS | Performed by: INTERNAL MEDICINE

## 2023-01-01 PROCEDURE — 96365 THER/PROPH/DIAG IV INF INIT: CPT

## 2023-01-01 RX ORDER — POLYETHYLENE GLYCOL 3350 17 G/17G
17 POWDER, FOR SOLUTION ORAL DAILY PRN
Status: DISCONTINUED | OUTPATIENT
Start: 2023-01-01 | End: 2023-01-01

## 2023-01-01 RX ORDER — ONDANSETRON 4 MG/1
4 TABLET, ORALLY DISINTEGRATING ORAL EVERY 8 HOURS PRN
Status: DISCONTINUED | OUTPATIENT
Start: 2023-01-01 | End: 2023-01-01

## 2023-01-01 RX ORDER — ONDANSETRON 2 MG/ML
4 INJECTION INTRAMUSCULAR; INTRAVENOUS EVERY 6 HOURS PRN
Status: DISCONTINUED | OUTPATIENT
Start: 2023-01-01 | End: 2023-01-01

## 2023-01-01 RX ORDER — FENTANYL CITRATE-0.9 % NACL/PF 10 MCG/ML
25-200 PLASTIC BAG, INJECTION (ML) INTRAVENOUS CONTINUOUS
Status: DISCONTINUED | OUTPATIENT
Start: 2023-01-01 | End: 2023-01-01

## 2023-01-01 RX ORDER — ALBUMIN (HUMAN) 12.5 G/50ML
25 SOLUTION INTRAVENOUS ONCE
Status: COMPLETED | OUTPATIENT
Start: 2023-01-01 | End: 2023-01-01

## 2023-01-01 RX ORDER — ESMOLOL HYDROCHLORIDE 10 MG/ML
INJECTION INTRAVENOUS PRN
Status: DISCONTINUED | OUTPATIENT
Start: 2023-01-01 | End: 2023-01-01 | Stop reason: SDUPTHER

## 2023-01-01 RX ORDER — SODIUM CHLORIDE 0.9 % (FLUSH) 0.9 %
5-40 SYRINGE (ML) INJECTION PRN
Status: DISCONTINUED | OUTPATIENT
Start: 2023-01-01 | End: 2023-01-01 | Stop reason: HOSPADM

## 2023-01-01 RX ORDER — ACETAMINOPHEN 325 MG/1
650 TABLET ORAL EVERY 6 HOURS PRN
Status: DISCONTINUED | OUTPATIENT
Start: 2023-01-01 | End: 2023-01-01

## 2023-01-01 RX ORDER — LABETALOL HYDROCHLORIDE 5 MG/ML
10 INJECTION, SOLUTION INTRAVENOUS
Status: DISCONTINUED | OUTPATIENT
Start: 2023-01-01 | End: 2023-01-01 | Stop reason: HOSPADM

## 2023-01-01 RX ORDER — PROCHLORPERAZINE EDISYLATE 5 MG/ML
10 INJECTION INTRAMUSCULAR; INTRAVENOUS EVERY 6 HOURS PRN
Status: DISCONTINUED | OUTPATIENT
Start: 2023-01-01 | End: 2023-01-01

## 2023-01-01 RX ORDER — SUCCINYLCHOLINE/SOD CL,ISO/PF 100 MG/5ML
SYRINGE (ML) INTRAVENOUS PRN
Status: DISCONTINUED | OUTPATIENT
Start: 2023-01-01 | End: 2023-01-01 | Stop reason: SDUPTHER

## 2023-01-01 RX ORDER — ALBUMIN (HUMAN) 12.5 G/50ML
25 SOLUTION INTRAVENOUS EVERY 8 HOURS
Status: DISCONTINUED | OUTPATIENT
Start: 2023-01-01 | End: 2023-01-01

## 2023-01-01 RX ORDER — CALCIUM GLUCONATE 10 MG/ML
1000 INJECTION, SOLUTION INTRAVENOUS PRN
Status: DISCONTINUED | OUTPATIENT
Start: 2023-01-01 | End: 2023-01-01

## 2023-01-01 RX ORDER — CALCIUM GLUCONATE 20 MG/ML
2000 INJECTION, SOLUTION INTRAVENOUS PRN
Status: DISCONTINUED | OUTPATIENT
Start: 2023-01-01 | End: 2023-01-01

## 2023-01-01 RX ORDER — POTASSIUM CHLORIDE 20 MEQ/1
20 TABLET, EXTENDED RELEASE ORAL ONCE
Status: COMPLETED | OUTPATIENT
Start: 2023-01-01 | End: 2023-01-01

## 2023-01-01 RX ORDER — NOREPINEPHRINE BITARTRATE 1 MG/ML
INJECTION, SOLUTION INTRAVENOUS
Status: DISCONTINUED
Start: 2023-01-01 | End: 2023-01-01

## 2023-01-01 RX ORDER — HEPARIN SODIUM 5000 [USP'U]/ML
10000 INJECTION, SOLUTION INTRAVENOUS; SUBCUTANEOUS EVERY 4 HOURS PRN
Status: DISCONTINUED | OUTPATIENT
Start: 2023-01-01 | End: 2023-01-01

## 2023-01-01 RX ORDER — METRONIDAZOLE 500 MG/100ML
500 INJECTION, SOLUTION INTRAVENOUS EVERY 8 HOURS
Status: DISCONTINUED | OUTPATIENT
Start: 2023-01-01 | End: 2023-01-01 | Stop reason: SDUPTHER

## 2023-01-01 RX ORDER — SODIUM CHLORIDE, SODIUM LACTATE, POTASSIUM CHLORIDE, CALCIUM CHLORIDE 600; 310; 30; 20 MG/100ML; MG/100ML; MG/100ML; MG/100ML
INJECTION, SOLUTION INTRAVENOUS CONTINUOUS PRN
Status: DISCONTINUED | OUTPATIENT
Start: 2023-01-01 | End: 2023-01-01 | Stop reason: SDUPTHER

## 2023-01-01 RX ORDER — SODIUM CHLORIDE 0.9 % (FLUSH) 0.9 %
5-40 SYRINGE (ML) INJECTION PRN
Status: DISCONTINUED | OUTPATIENT
Start: 2023-01-01 | End: 2023-01-01

## 2023-01-01 RX ORDER — CALCIUM GLUCONATE 10 MG/ML
1000 INJECTION, SOLUTION INTRAVENOUS ONCE
Status: DISCONTINUED | OUTPATIENT
Start: 2023-01-01 | End: 2023-01-01

## 2023-01-01 RX ORDER — ENOXAPARIN SODIUM 100 MG/ML
40 INJECTION SUBCUTANEOUS DAILY
Status: DISCONTINUED | OUTPATIENT
Start: 2023-01-01 | End: 2023-01-01 | Stop reason: SDUPTHER

## 2023-01-01 RX ORDER — PROPOFOL 10 MG/ML
INJECTION, EMULSION INTRAVENOUS
Status: COMPLETED
Start: 2023-01-01 | End: 2023-01-01

## 2023-01-01 RX ORDER — SODIUM CHLORIDE, SODIUM LACTATE, POTASSIUM CHLORIDE, AND CALCIUM CHLORIDE .6; .31; .03; .02 G/100ML; G/100ML; G/100ML; G/100ML
1000 INJECTION, SOLUTION INTRAVENOUS ONCE
Status: DISCONTINUED | OUTPATIENT
Start: 2023-01-01 | End: 2023-01-01

## 2023-01-01 RX ORDER — POTASSIUM CHLORIDE 20 MEQ/1
40 TABLET, EXTENDED RELEASE ORAL 2 TIMES DAILY
Status: COMPLETED | OUTPATIENT
Start: 2023-01-01 | End: 2023-01-01

## 2023-01-01 RX ORDER — ETOMIDATE 2 MG/ML
30 INJECTION INTRAVENOUS ONCE
Status: COMPLETED | OUTPATIENT
Start: 2023-01-01 | End: 2023-01-01

## 2023-01-01 RX ORDER — CALCIUM GLUCONATE 10 MG/ML
1000 INJECTION, SOLUTION INTRAVENOUS ONCE
Status: DISCONTINUED | OUTPATIENT
Start: 2023-01-01 | End: 2023-01-01 | Stop reason: SDUPTHER

## 2023-01-01 RX ORDER — HEPARIN SODIUM 5000 [USP'U]/ML
5000 INJECTION, SOLUTION INTRAVENOUS; SUBCUTANEOUS EVERY 8 HOURS SCHEDULED
Status: DISCONTINUED | OUTPATIENT
Start: 2023-01-01 | End: 2023-01-01

## 2023-01-01 RX ORDER — AMLODIPINE BESYLATE 10 MG/1
10 TABLET ORAL DAILY
Status: DISCONTINUED | OUTPATIENT
Start: 2023-01-01 | End: 2023-01-01

## 2023-01-01 RX ORDER — THIAMINE HYDROCHLORIDE 100 MG/ML
200 INJECTION, SOLUTION INTRAMUSCULAR; INTRAVENOUS ONCE
Status: COMPLETED | OUTPATIENT
Start: 2023-01-01 | End: 2023-01-01

## 2023-01-01 RX ORDER — PROPOFOL 10 MG/ML
50 INJECTION, EMULSION INTRAVENOUS
Status: COMPLETED | OUTPATIENT
Start: 2023-01-01 | End: 2023-01-01

## 2023-01-01 RX ORDER — ETOMIDATE 2 MG/ML
INJECTION INTRAVENOUS
Status: COMPLETED
Start: 2023-01-01 | End: 2023-01-01

## 2023-01-01 RX ORDER — LORAZEPAM 2 MG/ML
2 INJECTION INTRAMUSCULAR ONCE
Status: COMPLETED | OUTPATIENT
Start: 2023-01-01 | End: 2023-01-01

## 2023-01-01 RX ORDER — PROPOFOL 10 MG/ML
INJECTION, EMULSION INTRAVENOUS PRN
Status: DISCONTINUED | OUTPATIENT
Start: 2023-01-01 | End: 2023-01-01 | Stop reason: SDUPTHER

## 2023-01-01 RX ORDER — 0.9 % SODIUM CHLORIDE 0.9 %
1000 INTRAVENOUS SOLUTION INTRAVENOUS ONCE
Status: DISCONTINUED | OUTPATIENT
Start: 2023-01-01 | End: 2023-01-01

## 2023-01-01 RX ORDER — LORAZEPAM 2 MG/ML
1 INJECTION INTRAMUSCULAR EVERY 6 HOURS PRN
Status: DISCONTINUED | OUTPATIENT
Start: 2023-01-01 | End: 2023-06-23 | Stop reason: HOSPADM

## 2023-01-01 RX ORDER — PANTOPRAZOLE SODIUM 40 MG/10ML
40 INJECTION, POWDER, LYOPHILIZED, FOR SOLUTION INTRAVENOUS 2 TIMES DAILY
Status: DISCONTINUED | OUTPATIENT
Start: 2023-01-01 | End: 2023-01-01

## 2023-01-01 RX ORDER — LORAZEPAM 2 MG/ML
INJECTION INTRAMUSCULAR
Status: COMPLETED
Start: 2023-01-01 | End: 2023-01-01

## 2023-01-01 RX ORDER — LIDOCAINE HYDROCHLORIDE 20 MG/ML
INJECTION, SOLUTION EPIDURAL; INFILTRATION; INTRACAUDAL; PERINEURAL PRN
Status: DISCONTINUED | OUTPATIENT
Start: 2023-01-01 | End: 2023-01-01 | Stop reason: SDUPTHER

## 2023-01-01 RX ORDER — OXYCODONE HYDROCHLORIDE 5 MG/1
10 TABLET ORAL PRN
Status: DISCONTINUED | OUTPATIENT
Start: 2023-01-01 | End: 2023-01-01 | Stop reason: HOSPADM

## 2023-01-01 RX ORDER — LISINOPRIL 40 MG/1
40 TABLET ORAL DAILY
Status: DISCONTINUED | OUTPATIENT
Start: 2023-01-01 | End: 2023-01-01

## 2023-01-01 RX ORDER — FENTANYL CITRATE 50 UG/ML
25 INJECTION, SOLUTION INTRAMUSCULAR; INTRAVENOUS EVERY 5 MIN PRN
Status: DISCONTINUED | OUTPATIENT
Start: 2023-01-01 | End: 2023-01-01 | Stop reason: HOSPADM

## 2023-01-01 RX ORDER — MORPHINE SULFATE 20 MG/ML
5 SOLUTION ORAL
Status: DISCONTINUED | OUTPATIENT
Start: 2023-01-01 | End: 2023-01-01

## 2023-01-01 RX ORDER — FUROSEMIDE 10 MG/ML
30 INJECTION INTRAMUSCULAR; INTRAVENOUS ONCE
Status: COMPLETED | OUTPATIENT
Start: 2023-01-01 | End: 2023-01-01

## 2023-01-01 RX ORDER — POTASSIUM CHLORIDE 20 MEQ/1
40 TABLET, EXTENDED RELEASE ORAL ONCE
Status: DISCONTINUED | OUTPATIENT
Start: 2023-01-01 | End: 2023-01-01

## 2023-01-01 RX ORDER — MAGNESIUM SULFATE IN WATER 40 MG/ML
2000 INJECTION, SOLUTION INTRAVENOUS ONCE
Status: COMPLETED | OUTPATIENT
Start: 2023-01-01 | End: 2023-01-01

## 2023-01-01 RX ORDER — SODIUM CHLORIDE 0.9 % (FLUSH) 0.9 %
5-40 SYRINGE (ML) INJECTION EVERY 12 HOURS SCHEDULED
Status: DISCONTINUED | OUTPATIENT
Start: 2023-01-01 | End: 2023-01-01 | Stop reason: HOSPADM

## 2023-01-01 RX ORDER — DEXTROSE MONOHYDRATE 100 MG/ML
INJECTION, SOLUTION INTRAVENOUS
Status: COMPLETED
Start: 2023-01-01 | End: 2023-01-01

## 2023-01-01 RX ORDER — MAGNESIUM SULFATE 1 G/100ML
1000 INJECTION INTRAVENOUS PRN
Status: DISCONTINUED | OUTPATIENT
Start: 2023-01-01 | End: 2023-01-01

## 2023-01-01 RX ORDER — ONDANSETRON 4 MG/1
4 TABLET, ORALLY DISINTEGRATING ORAL EVERY 8 HOURS PRN
Status: DISCONTINUED | OUTPATIENT
Start: 2023-01-01 | End: 2023-01-01 | Stop reason: ALTCHOICE

## 2023-01-01 RX ORDER — OXYCODONE HYDROCHLORIDE 5 MG/1
5 TABLET ORAL PRN
Status: DISCONTINUED | OUTPATIENT
Start: 2023-01-01 | End: 2023-01-01 | Stop reason: HOSPADM

## 2023-01-01 RX ORDER — METRONIDAZOLE 500 MG/100ML
500 INJECTION, SOLUTION INTRAVENOUS EVERY 8 HOURS
Status: DISCONTINUED | OUTPATIENT
Start: 2023-01-01 | End: 2023-01-01

## 2023-01-01 RX ORDER — ONDANSETRON 2 MG/ML
INJECTION INTRAMUSCULAR; INTRAVENOUS PRN
Status: DISCONTINUED | OUTPATIENT
Start: 2023-01-01 | End: 2023-01-01 | Stop reason: SDUPTHER

## 2023-01-01 RX ORDER — LORAZEPAM 2 MG/ML
INJECTION INTRAMUSCULAR
Status: DISCONTINUED
Start: 2023-01-01 | End: 2023-01-01 | Stop reason: WASHOUT

## 2023-01-01 RX ORDER — POTASSIUM CHLORIDE 29.8 MG/ML
20 INJECTION INTRAVENOUS PRN
Status: DISCONTINUED | OUTPATIENT
Start: 2023-01-01 | End: 2023-01-01

## 2023-01-01 RX ORDER — GLUCAGON 1 MG/ML
1 KIT INJECTION PRN
Status: DISCONTINUED | OUTPATIENT
Start: 2023-01-01 | End: 2023-01-01

## 2023-01-01 RX ORDER — ACETAMINOPHEN 650 MG/1
650 SUPPOSITORY RECTAL EVERY 6 HOURS PRN
Status: DISCONTINUED | OUTPATIENT
Start: 2023-01-01 | End: 2023-01-01

## 2023-01-01 RX ORDER — HYDRALAZINE HYDROCHLORIDE 20 MG/ML
10 INJECTION INTRAMUSCULAR; INTRAVENOUS
Status: DISCONTINUED | OUTPATIENT
Start: 2023-01-01 | End: 2023-01-01 | Stop reason: HOSPADM

## 2023-01-01 RX ORDER — SODIUM CHLORIDE, SODIUM GLUCONATE, SODIUM ACETATE, POTASSIUM CHLORIDE AND MAGNESIUM CHLORIDE 526; 502; 368; 37; 30 MG/100ML; MG/100ML; MG/100ML; MG/100ML; MG/100ML
1000 INJECTION, SOLUTION INTRAVENOUS ONCE
Status: COMPLETED | OUTPATIENT
Start: 2023-01-01 | End: 2023-01-01

## 2023-01-01 RX ORDER — SODIUM CHLORIDE 0.9 % (FLUSH) 0.9 %
5-40 SYRINGE (ML) INJECTION EVERY 12 HOURS SCHEDULED
Status: DISCONTINUED | OUTPATIENT
Start: 2023-01-01 | End: 2023-06-23 | Stop reason: HOSPADM

## 2023-01-01 RX ORDER — PROPOFOL 10 MG/ML
5-50 INJECTION, EMULSION INTRAVENOUS CONTINUOUS
Status: DISCONTINUED | OUTPATIENT
Start: 2023-01-01 | End: 2023-01-01

## 2023-01-01 RX ORDER — SODIUM CHLORIDE 9 MG/ML
INJECTION, SOLUTION INTRAVENOUS PRN
Status: DISCONTINUED | OUTPATIENT
Start: 2023-01-01 | End: 2023-01-01 | Stop reason: HOSPADM

## 2023-01-01 RX ORDER — MORPHINE SULFATE 2 MG/ML
2 INJECTION, SOLUTION INTRAMUSCULAR; INTRAVENOUS
Status: DISCONTINUED | OUTPATIENT
Start: 2023-01-01 | End: 2023-06-23 | Stop reason: HOSPADM

## 2023-01-01 RX ORDER — HEPARIN SODIUM 1000 [USP'U]/ML
10000 INJECTION, SOLUTION INTRAVENOUS; SUBCUTANEOUS PRN
Status: DISCONTINUED | OUTPATIENT
Start: 2023-01-01 | End: 2023-01-01

## 2023-01-01 RX ORDER — SODIUM CHLORIDE, SODIUM LACTATE, POTASSIUM CHLORIDE, CALCIUM CHLORIDE 600; 310; 30; 20 MG/100ML; MG/100ML; MG/100ML; MG/100ML
INJECTION, SOLUTION INTRAVENOUS ONCE
Status: COMPLETED | OUTPATIENT
Start: 2023-01-01 | End: 2023-01-01

## 2023-01-01 RX ORDER — SODIUM CHLORIDE 9 MG/ML
INJECTION, SOLUTION INTRAVENOUS CONTINUOUS
Status: ACTIVE | OUTPATIENT
Start: 2023-01-01 | End: 2023-01-01

## 2023-01-01 RX ORDER — SODIUM CHLORIDE 9 MG/ML
INJECTION, SOLUTION INTRAVENOUS PRN
Status: DISCONTINUED | OUTPATIENT
Start: 2023-01-01 | End: 2023-01-01

## 2023-01-01 RX ORDER — PROCHLORPERAZINE EDISYLATE 5 MG/ML
5 INJECTION INTRAMUSCULAR; INTRAVENOUS
Status: DISCONTINUED | OUTPATIENT
Start: 2023-01-01 | End: 2023-01-01 | Stop reason: HOSPADM

## 2023-01-01 RX ORDER — CALCIUM CHLORIDE 100 MG/ML
500 INJECTION INTRAVENOUS; INTRAVENTRICULAR ONCE
Status: COMPLETED | OUTPATIENT
Start: 2023-01-01 | End: 2023-01-01

## 2023-01-01 RX ORDER — IPRATROPIUM BROMIDE AND ALBUTEROL SULFATE 2.5; .5 MG/3ML; MG/3ML
1 SOLUTION RESPIRATORY (INHALATION) EVERY 4 HOURS PRN
Status: DISCONTINUED | OUTPATIENT
Start: 2023-01-01 | End: 2023-01-01

## 2023-01-01 RX ORDER — SODIUM CHLORIDE, SODIUM LACTATE, POTASSIUM CHLORIDE, AND CALCIUM CHLORIDE .6; .31; .03; .02 G/100ML; G/100ML; G/100ML; G/100ML
500 INJECTION, SOLUTION INTRAVENOUS ONCE
Status: COMPLETED | OUTPATIENT
Start: 2023-01-01 | End: 2023-01-01

## 2023-01-01 RX ORDER — HYDROMORPHONE HYDROCHLORIDE 1 MG/ML
0.5 INJECTION, SOLUTION INTRAMUSCULAR; INTRAVENOUS; SUBCUTANEOUS EVERY 5 MIN PRN
Status: DISCONTINUED | OUTPATIENT
Start: 2023-01-01 | End: 2023-01-01 | Stop reason: HOSPADM

## 2023-01-01 RX ORDER — ONDANSETRON 2 MG/ML
4 INJECTION INTRAMUSCULAR; INTRAVENOUS
Status: DISCONTINUED | OUTPATIENT
Start: 2023-01-01 | End: 2023-01-01 | Stop reason: HOSPADM

## 2023-01-01 RX ORDER — CARVEDILOL 6.25 MG/1
12.5 TABLET ORAL 2 TIMES DAILY
Status: DISCONTINUED | OUTPATIENT
Start: 2023-01-01 | End: 2023-01-01

## 2023-01-01 RX ADMIN — ALBUMIN (HUMAN) 25 G: 0.25 INJECTION, SOLUTION INTRAVENOUS at 02:49

## 2023-01-01 RX ADMIN — SODIUM CHLORIDE 25 ML: 9 INJECTION, SOLUTION INTRAVENOUS at 23:58

## 2023-01-01 RX ADMIN — SODIUM CHLORIDE: 9 INJECTION, SOLUTION INTRAVENOUS at 10:00

## 2023-01-01 RX ADMIN — EPINEPHRINE 20 MCG/MIN: 1 INJECTION INTRAMUSCULAR; INTRAVENOUS; SUBCUTANEOUS at 18:03

## 2023-01-01 RX ADMIN — ETOMIDATE INJECTION 30 MG: 2 SOLUTION INTRAVENOUS at 00:53

## 2023-01-01 RX ADMIN — SODIUM CHLORIDE, PRESERVATIVE FREE 10 ML: 5 INJECTION INTRAVENOUS at 08:20

## 2023-01-01 RX ADMIN — ETOMIDATE 30 MG: 2 INJECTION INTRAVENOUS at 00:53

## 2023-01-01 RX ADMIN — SODIUM BICARBONATE: 84 INJECTION, SOLUTION INTRAVENOUS at 11:35

## 2023-01-01 RX ADMIN — METRONIDAZOLE 500 MG: 500 INJECTION, SOLUTION INTRAVENOUS at 14:31

## 2023-01-01 RX ADMIN — ACETAMINOPHEN 650 MG: 325 TABLET ORAL at 09:57

## 2023-01-01 RX ADMIN — SODIUM CHLORIDE, PRESERVATIVE FREE 10 ML: 5 INJECTION INTRAVENOUS at 20:36

## 2023-01-01 RX ADMIN — PANTOPRAZOLE SODIUM 40 MG: 40 INJECTION, POWDER, FOR SOLUTION INTRAVENOUS at 08:59

## 2023-01-01 RX ADMIN — Medication: at 15:04

## 2023-01-01 RX ADMIN — METRONIDAZOLE 500 MG: 500 INJECTION, SOLUTION INTRAVENOUS at 17:47

## 2023-01-01 RX ADMIN — LISINOPRIL 40 MG: 40 TABLET ORAL at 10:10

## 2023-01-01 RX ADMIN — NOREPINEPHRINE BITARTRATE 10 MCG/MIN: 1 INJECTION, SOLUTION, CONCENTRATE INTRAVENOUS at 23:29

## 2023-01-01 RX ADMIN — CEFEPIME 2000 MG: 2 INJECTION, POWDER, FOR SOLUTION INTRAVENOUS at 20:54

## 2023-01-01 RX ADMIN — ONDANSETRON 4 MG: 2 INJECTION INTRAMUSCULAR; INTRAVENOUS at 15:52

## 2023-01-01 RX ADMIN — METRONIDAZOLE 500 MG: 500 INJECTION, SOLUTION INTRAVENOUS at 10:01

## 2023-01-01 RX ADMIN — ALBUMIN (HUMAN) 25 G: 0.25 INJECTION, SOLUTION INTRAVENOUS at 14:03

## 2023-01-01 RX ADMIN — PANTOPRAZOLE SODIUM 40 MG: 40 INJECTION, POWDER, FOR SOLUTION INTRAVENOUS at 20:36

## 2023-01-01 RX ADMIN — CEFEPIME 2000 MG: 2 INJECTION, POWDER, FOR SOLUTION INTRAVENOUS at 16:55

## 2023-01-01 RX ADMIN — SODIUM CHLORIDE, PRESERVATIVE FREE 10 ML: 5 INJECTION INTRAVENOUS at 20:07

## 2023-01-01 RX ADMIN — LORAZEPAM 2 MG: 2 INJECTION INTRAMUSCULAR at 11:04

## 2023-01-01 RX ADMIN — SODIUM BICARBONATE 50 MEQ: 84 INJECTION, SOLUTION INTRAVENOUS at 22:17

## 2023-01-01 RX ADMIN — SODIUM BICARBONATE: 84 INJECTION, SOLUTION INTRAVENOUS at 23:29

## 2023-01-01 RX ADMIN — NOREPINEPHRINE BITARTRATE 100 MCG/MIN: 1 INJECTION, SOLUTION, CONCENTRATE INTRAVENOUS at 09:22

## 2023-01-01 RX ADMIN — SODIUM CHLORIDE, SODIUM LACTATE, POTASSIUM CHLORIDE, AND CALCIUM CHLORIDE: .6; .31; .03; .02 INJECTION, SOLUTION INTRAVENOUS at 15:30

## 2023-01-01 RX ADMIN — ENOXAPARIN SODIUM 40 MG: 100 INJECTION SUBCUTANEOUS at 10:10

## 2023-01-01 RX ADMIN — PROCHLORPERAZINE EDISYLATE 10 MG: 5 INJECTION, SOLUTION INTRAMUSCULAR; INTRAVENOUS at 13:39

## 2023-01-01 RX ADMIN — Medication: at 03:43

## 2023-01-01 RX ADMIN — SODIUM CHLORIDE: 9 INJECTION, SOLUTION INTRAVENOUS at 21:33

## 2023-01-01 RX ADMIN — EPINEPHRINE 1 MCG/MIN: 1 INJECTION INTRAMUSCULAR; INTRAVENOUS; SUBCUTANEOUS at 11:03

## 2023-01-01 RX ADMIN — PHENYLEPHRINE HYDROCHLORIDE 300 MCG/MIN: 50 INJECTION INTRAVENOUS at 12:21

## 2023-01-01 RX ADMIN — NOREPINEPHRINE BITARTRATE 5 MCG/MIN: 1 INJECTION, SOLUTION, CONCENTRATE INTRAVENOUS at 22:43

## 2023-01-01 RX ADMIN — THIAMINE HYDROCHLORIDE 200 MG: 100 INJECTION, SOLUTION INTRAMUSCULAR; INTRAVENOUS at 02:47

## 2023-01-01 RX ADMIN — ENOXAPARIN SODIUM 40 MG: 100 INJECTION SUBCUTANEOUS at 21:22

## 2023-01-01 RX ADMIN — HYDRALAZINE HYDROCHLORIDE 10 MG: 20 INJECTION INTRAMUSCULAR; INTRAVENOUS at 16:18

## 2023-01-01 RX ADMIN — CEFEPIME 2000 MG: 2 INJECTION, POWDER, FOR SOLUTION INTRAVENOUS at 19:04

## 2023-01-01 RX ADMIN — PANTOPRAZOLE SODIUM 40 MG: 40 INJECTION, POWDER, FOR SOLUTION INTRAVENOUS at 08:23

## 2023-01-01 RX ADMIN — METRONIDAZOLE 500 MG: 500 INJECTION, SOLUTION INTRAVENOUS at 19:10

## 2023-01-01 RX ADMIN — LISINOPRIL 40 MG: 40 TABLET ORAL at 22:40

## 2023-01-01 RX ADMIN — SODIUM BICARBONATE 50 MEQ: 84 INJECTION, SOLUTION INTRAVENOUS at 01:30

## 2023-01-01 RX ADMIN — AMIODARONE HYDROCHLORIDE 150 MG: 50 INJECTION, SOLUTION INTRAVENOUS at 01:29

## 2023-01-01 RX ADMIN — METRONIDAZOLE 500 MG: 500 INJECTION, SOLUTION INTRAVENOUS at 10:15

## 2023-01-01 RX ADMIN — SODIUM CHLORIDE, PRESERVATIVE FREE 10 ML: 5 INJECTION INTRAVENOUS at 09:05

## 2023-01-01 RX ADMIN — PANTOPRAZOLE SODIUM 40 MG: 40 INJECTION, POWDER, FOR SOLUTION INTRAVENOUS at 20:07

## 2023-01-01 RX ADMIN — SODIUM CHLORIDE, SODIUM LACTATE, POTASSIUM CHLORIDE, AND CALCIUM CHLORIDE 500 ML: .6; .31; .03; .02 INJECTION, SOLUTION INTRAVENOUS at 11:30

## 2023-01-01 RX ADMIN — SODIUM CHLORIDE, POTASSIUM CHLORIDE, SODIUM LACTATE AND CALCIUM CHLORIDE 1000 ML: 600; 310; 30; 20 INJECTION, SOLUTION INTRAVENOUS at 21:14

## 2023-01-01 RX ADMIN — PANTOPRAZOLE SODIUM 40 MG: 40 INJECTION, POWDER, FOR SOLUTION INTRAVENOUS at 09:56

## 2023-01-01 RX ADMIN — IOPAMIDOL 75 ML: 755 INJECTION, SOLUTION INTRAVENOUS at 11:52

## 2023-01-01 RX ADMIN — METRONIDAZOLE 500 MG: 500 INJECTION, SOLUTION INTRAVENOUS at 02:34

## 2023-01-01 RX ADMIN — NOREPINEPHRINE BITARTRATE 100 MCG/MIN: 1 INJECTION, SOLUTION, CONCENTRATE INTRAVENOUS at 12:02

## 2023-01-01 RX ADMIN — METRONIDAZOLE 500 MG: 500 INJECTION, SOLUTION INTRAVENOUS at 23:59

## 2023-01-01 RX ADMIN — POTASSIUM CHLORIDE 40 MEQ: 1500 TABLET, EXTENDED RELEASE ORAL at 08:58

## 2023-01-01 RX ADMIN — CEFEPIME 2000 MG: 2 INJECTION, POWDER, FOR SOLUTION INTRAVENOUS at 05:58

## 2023-01-01 RX ADMIN — METRONIDAZOLE 500 MG: 500 INJECTION, SOLUTION INTRAVENOUS at 02:08

## 2023-01-01 RX ADMIN — SODIUM CHLORIDE, PRESERVATIVE FREE 10 ML: 5 INJECTION INTRAVENOUS at 20:42

## 2023-01-01 RX ADMIN — SODIUM CHLORIDE, PRESERVATIVE FREE 10 ML: 5 INJECTION INTRAVENOUS at 09:57

## 2023-01-01 RX ADMIN — AMLODIPINE BESYLATE 10 MG: 10 TABLET ORAL at 10:10

## 2023-01-01 RX ADMIN — PANTOPRAZOLE SODIUM 40 MG: 40 INJECTION, POWDER, FOR SOLUTION INTRAVENOUS at 09:04

## 2023-01-01 RX ADMIN — CEFEPIME 2000 MG: 2 INJECTION, POWDER, FOR SOLUTION INTRAVENOUS at 11:20

## 2023-01-01 RX ADMIN — PHENYLEPHRINE HYDROCHLORIDE 300 MCG/MIN: 50 INJECTION INTRAVENOUS at 15:17

## 2023-01-01 RX ADMIN — ESMOLOL HYDROCHLORIDE 20 MG: 10 INJECTION, SOLUTION INTRAVENOUS at 16:18

## 2023-01-01 RX ADMIN — MORPHINE SULFATE 2 MG: 2 INJECTION, SOLUTION INTRAMUSCULAR; INTRAVENOUS at 18:43

## 2023-01-01 RX ADMIN — ALBUMIN (HUMAN) 25 G: 0.25 INJECTION, SOLUTION INTRAVENOUS at 15:54

## 2023-01-01 RX ADMIN — PROPOFOL 50 MG: 10 INJECTION, EMULSION INTRAVENOUS at 23:45

## 2023-01-01 RX ADMIN — FUROSEMIDE 30 MG: 10 INJECTION, SOLUTION INTRAMUSCULAR; INTRAVENOUS at 17:19

## 2023-01-01 RX ADMIN — SODIUM CHLORIDE 25 ML: 9 INJECTION, SOLUTION INTRAVENOUS at 14:30

## 2023-01-01 RX ADMIN — CALCIUM GLUCONATE 1000 MG: 10 INJECTION, SOLUTION INTRAVENOUS at 15:31

## 2023-01-01 RX ADMIN — SODIUM CHLORIDE, PRESERVATIVE FREE 10 ML: 5 INJECTION INTRAVENOUS at 10:10

## 2023-01-01 RX ADMIN — NOREPINEPHRINE BITARTRATE 100 MCG/MIN: 1 INJECTION, SOLUTION, CONCENTRATE INTRAVENOUS at 17:25

## 2023-01-01 RX ADMIN — METRONIDAZOLE 500 MG: 500 INJECTION, SOLUTION INTRAVENOUS at 18:13

## 2023-01-01 RX ADMIN — SODIUM CHLORIDE: 9 INJECTION, SOLUTION INTRAVENOUS at 02:05

## 2023-01-01 RX ADMIN — PROPOFOL 10 MCG/KG/MIN: 10 INJECTION, EMULSION INTRAVENOUS at 11:48

## 2023-01-01 RX ADMIN — SODIUM CHLORIDE, SODIUM GLUCONATE, SODIUM ACETATE, POTASSIUM CHLORIDE AND MAGNESIUM CHLORIDE 1000 ML/HR: 526; 502; 368; 37; 30 INJECTION, SOLUTION INTRAVENOUS at 16:13

## 2023-01-01 RX ADMIN — Medication: at 15:16

## 2023-01-01 RX ADMIN — SODIUM CHLORIDE: 9 INJECTION, SOLUTION INTRAVENOUS at 02:09

## 2023-01-01 RX ADMIN — PHENYLEPHRINE HYDROCHLORIDE 30 MCG/MIN: 50 INJECTION INTRAVENOUS at 07:41

## 2023-01-01 RX ADMIN — PANTOPRAZOLE SODIUM 40 MG: 40 INJECTION, POWDER, FOR SOLUTION INTRAVENOUS at 11:20

## 2023-01-01 RX ADMIN — METRONIDAZOLE 500 MG: 500 INJECTION, SOLUTION INTRAVENOUS at 10:50

## 2023-01-01 RX ADMIN — CEFEPIME 2000 MG: 2 INJECTION, POWDER, FOR SOLUTION INTRAVENOUS at 17:32

## 2023-01-01 RX ADMIN — AMLODIPINE BESYLATE 10 MG: 10 TABLET ORAL at 22:40

## 2023-01-01 RX ADMIN — PROPOFOL 15 MCG/KG/MIN: 10 INJECTION, EMULSION INTRAVENOUS at 00:50

## 2023-01-01 RX ADMIN — Medication 120 MG: at 15:43

## 2023-01-01 RX ADMIN — SODIUM CHLORIDE, PRESERVATIVE FREE 10 ML: 5 INJECTION INTRAVENOUS at 08:58

## 2023-01-01 RX ADMIN — PERFLUTREN 1.5 ML: 6.52 INJECTION, SUSPENSION INTRAVENOUS at 09:06

## 2023-01-01 RX ADMIN — CEFEPIME 2000 MG: 2 INJECTION, POWDER, FOR SOLUTION INTRAVENOUS at 05:52

## 2023-01-01 RX ADMIN — CEFEPIME 2000 MG: 2 INJECTION, POWDER, FOR SOLUTION INTRAVENOUS at 09:13

## 2023-01-01 RX ADMIN — CEFEPIME 2000 MG: 2 INJECTION, POWDER, FOR SOLUTION INTRAVENOUS at 06:05

## 2023-01-01 RX ADMIN — MAGNESIUM SULFATE HEPTAHYDRATE 2000 MG: 40 INJECTION, SOLUTION INTRAVENOUS at 12:00

## 2023-01-01 RX ADMIN — PANTOPRAZOLE SODIUM 40 MG: 40 INJECTION, POWDER, FOR SOLUTION INTRAVENOUS at 08:20

## 2023-01-01 RX ADMIN — ENOXAPARIN SODIUM 40 MG: 100 INJECTION SUBCUTANEOUS at 18:24

## 2023-01-01 RX ADMIN — METRONIDAZOLE 500 MG: 500 INJECTION, SOLUTION INTRAVENOUS at 10:48

## 2023-01-01 RX ADMIN — SODIUM CHLORIDE: 9 INJECTION, SOLUTION INTRAVENOUS at 00:14

## 2023-01-01 RX ADMIN — LORAZEPAM 1 MG: 2 INJECTION INTRAMUSCULAR; INTRAVENOUS at 18:42

## 2023-01-01 RX ADMIN — INSULIN HUMAN 10 UNITS: 100 INJECTION, SOLUTION PARENTERAL at 23:48

## 2023-01-01 RX ADMIN — SODIUM CHLORIDE 25 ML: 9 INJECTION, SOLUTION INTRAVENOUS at 09:11

## 2023-01-01 RX ADMIN — METRONIDAZOLE 500 MG: 500 INJECTION, SOLUTION INTRAVENOUS at 03:33

## 2023-01-01 RX ADMIN — PROPOFOL 25 MG: 10 INJECTION, EMULSION INTRAVENOUS at 16:12

## 2023-01-01 RX ADMIN — METRONIDAZOLE 500 MG: 500 INJECTION, SOLUTION INTRAVENOUS at 17:33

## 2023-01-01 RX ADMIN — MEROPENEM 1000 MG: 1 INJECTION, POWDER, FOR SOLUTION INTRAVENOUS at 12:22

## 2023-01-01 RX ADMIN — SODIUM CHLORIDE 25 ML: 9 INJECTION, SOLUTION INTRAVENOUS at 20:53

## 2023-01-01 RX ADMIN — PROPOFOL 150 MG: 10 INJECTION, EMULSION INTRAVENOUS at 15:43

## 2023-01-01 RX ADMIN — SODIUM CHLORIDE, POTASSIUM CHLORIDE, SODIUM LACTATE AND CALCIUM CHLORIDE: 600; 310; 30; 20 INJECTION, SOLUTION INTRAVENOUS at 14:27

## 2023-01-01 RX ADMIN — Medication: at 10:06

## 2023-01-01 RX ADMIN — DEXTROSE MONOHYDRATE 250 ML: 100 INJECTION, SOLUTION INTRAVENOUS at 23:53

## 2023-01-01 RX ADMIN — POTASSIUM CHLORIDE 20 MEQ: 1500 TABLET, EXTENDED RELEASE ORAL at 08:58

## 2023-01-01 RX ADMIN — SODIUM CHLORIDE, PRESERVATIVE FREE 10 ML: 5 INJECTION INTRAVENOUS at 21:23

## 2023-01-01 RX ADMIN — NOREPINEPHRINE BITARTRATE 100 MCG/MIN: 1 INJECTION, SOLUTION, CONCENTRATE INTRAVENOUS at 14:48

## 2023-01-01 RX ADMIN — SODIUM CHLORIDE: 9 INJECTION, SOLUTION INTRAVENOUS at 05:47

## 2023-01-01 RX ADMIN — CALCIUM CHLORIDE 500 MG: 100 INJECTION, SOLUTION INTRAVENOUS at 00:26

## 2023-01-01 RX ADMIN — SODIUM BICARBONATE: 84 INJECTION, SOLUTION INTRAVENOUS at 03:03

## 2023-01-01 RX ADMIN — SODIUM CHLORIDE: 9 INJECTION, SOLUTION INTRAVENOUS at 06:01

## 2023-01-01 RX ADMIN — SODIUM CHLORIDE, PRESERVATIVE FREE 10 ML: 5 INJECTION INTRAVENOUS at 09:56

## 2023-01-01 RX ADMIN — CARVEDILOL 12.5 MG: 12.5 TABLET, FILM COATED ORAL at 10:10

## 2023-01-01 RX ADMIN — METRONIDAZOLE 500 MG: 500 INJECTION, SOLUTION INTRAVENOUS at 02:12

## 2023-01-01 RX ADMIN — VASOPRESSIN 0.03 UNITS/MIN: 20 INJECTION INTRAVENOUS at 10:39

## 2023-01-01 RX ADMIN — ALBUMIN (HUMAN) 25 G: 0.25 INJECTION, SOLUTION INTRAVENOUS at 10:28

## 2023-01-01 RX ADMIN — SODIUM BICARBONATE: 84 INJECTION, SOLUTION INTRAVENOUS at 11:28

## 2023-01-01 RX ADMIN — POTASSIUM CHLORIDE 40 MEQ: 1500 TABLET, EXTENDED RELEASE ORAL at 20:07

## 2023-01-01 RX ADMIN — SODIUM CHLORIDE, PRESERVATIVE FREE 10 ML: 5 INJECTION INTRAVENOUS at 08:23

## 2023-01-01 RX ADMIN — CARVEDILOL 12.5 MG: 12.5 TABLET, FILM COATED ORAL at 22:40

## 2023-01-01 RX ADMIN — CEFEPIME 2000 MG: 2 INJECTION, POWDER, FOR SOLUTION INTRAVENOUS at 17:42

## 2023-01-01 RX ADMIN — LORAZEPAM 2 MG: 2 INJECTION INTRAMUSCULAR; INTRAVENOUS at 11:04

## 2023-01-01 RX ADMIN — PANTOPRAZOLE SODIUM 40 MG: 40 INJECTION, POWDER, FOR SOLUTION INTRAVENOUS at 20:42

## 2023-01-01 RX ADMIN — SODIUM CHLORIDE, PRESERVATIVE FREE 10 ML: 5 INJECTION INTRAVENOUS at 20:50

## 2023-01-01 RX ADMIN — METRONIDAZOLE 500 MG: 500 INJECTION, SOLUTION INTRAVENOUS at 17:53

## 2023-01-01 RX ADMIN — PHENYLEPHRINE HYDROCHLORIDE 300 MCG/MIN: 50 INJECTION INTRAVENOUS at 18:11

## 2023-01-01 RX ADMIN — LIDOCAINE HYDROCHLORIDE 100 MG: 20 INJECTION, SOLUTION EPIDURAL; INFILTRATION; INTRACAUDAL; PERINEURAL at 15:42

## 2023-01-01 RX ADMIN — PANTOPRAZOLE SODIUM 40 MG: 40 INJECTION, POWDER, FOR SOLUTION INTRAVENOUS at 20:50

## 2023-01-01 RX ADMIN — MEROPENEM 1000 MG: 1 INJECTION, POWDER, FOR SOLUTION INTRAVENOUS at 00:50

## 2023-01-01 ASSESSMENT — PULMONARY FUNCTION TESTS
PIF_VALUE: 16
PIF_VALUE: 15
PIF_VALUE: 14
PIF_VALUE: 15
PIF_VALUE: 14
PIF_VALUE: 15
PIF_VALUE: 18
PIF_VALUE: 15
PIF_VALUE: 16
PIF_VALUE: 15
PIF_VALUE: 15
PIF_VALUE: 23
PIF_VALUE: 15
PIF_VALUE: 20
PIF_VALUE: 23
PIF_VALUE: 8
PIF_VALUE: 15
PIF_VALUE: 15
PIF_VALUE: 16
PIF_VALUE: 15
PIF_VALUE: 22
PIF_VALUE: 20
PIF_VALUE: 15
PIF_VALUE: 19
PIF_VALUE: 8
PIF_VALUE: 9
PIF_VALUE: 22
PIF_VALUE: 15
PIF_VALUE: 9
PIF_VALUE: 15
PIF_VALUE: 19
PIF_VALUE: 15
PIF_VALUE: 24
PIF_VALUE: 15
PIF_VALUE: 16
PIF_VALUE: 15
PIF_VALUE: 15
PIF_VALUE: 16
PIF_VALUE: 17
PIF_VALUE: 15
PIF_VALUE: 16
PIF_VALUE: 15
PIF_VALUE: 8
PIF_VALUE: 16
PIF_VALUE: 15
PIF_VALUE: 16
PIF_VALUE: 15
PIF_VALUE: 17
PIF_VALUE: 15
PIF_VALUE: 8
PIF_VALUE: 15
PIF_VALUE: 23
PIF_VALUE: 9
PIF_VALUE: 16
PIF_VALUE: 15
PIF_VALUE: 17
PIF_VALUE: 15
PIF_VALUE: 16
PIF_VALUE: 15
PIF_VALUE: 16
PIF_VALUE: 15
PIF_VALUE: 16
PIF_VALUE: 15
PIF_VALUE: 18
PIF_VALUE: 15
PIF_VALUE: 15
PIF_VALUE: 7
PIF_VALUE: 15
PIF_VALUE: 20
PIF_VALUE: 15

## 2023-01-01 ASSESSMENT — ENCOUNTER SYMPTOMS
SHORTNESS OF BREATH: 0
SORE THROAT: 0
COLOR CHANGE: 0
BLOOD IN STOOL: 0
COUGH: 0
WHEEZING: 0
ABDOMINAL PAIN: 0
ANAL BLEEDING: 0
ABDOMINAL PAIN: 0
ABDOMINAL DISTENTION: 1
SHORTNESS OF BREATH: 0
VOMITING: 1
ABDOMINAL DISTENTION: 1
CONSTIPATION: 0
NAUSEA: 1
NAUSEA: 1
ABDOMINAL DISTENTION: 1
DIARRHEA: 0
SHORTNESS OF BREATH: 0
BLOOD IN STOOL: 0
COUGH: 0
VOMITING: 1

## 2023-01-01 ASSESSMENT — PAIN DESCRIPTION - PAIN TYPE
TYPE: ACUTE PAIN

## 2023-01-01 ASSESSMENT — PAIN DESCRIPTION - DESCRIPTORS
DESCRIPTORS: ACHING;SHARP
DESCRIPTORS: ACHING

## 2023-01-01 ASSESSMENT — PAIN DESCRIPTION - ORIENTATION
ORIENTATION: RIGHT;LEFT
ORIENTATION: POSTERIOR;LEFT;RIGHT
ORIENTATION: RIGHT;LEFT

## 2023-01-01 ASSESSMENT — PAIN SCALES - GENERAL
PAINLEVEL_OUTOF10: 0
PAINLEVEL_OUTOF10: 6
PAINLEVEL_OUTOF10: 0
PAINLEVEL_OUTOF10: 4
PAINLEVEL_OUTOF10: 0
PAINLEVEL_OUTOF10: 0
PAINLEVEL_OUTOF10: 4
PAINLEVEL_OUTOF10: 4
PAINLEVEL_OUTOF10: 0
PAINLEVEL_OUTOF10: 5
PAINLEVEL_OUTOF10: 0
PAINLEVEL_OUTOF10: 2
PAINLEVEL_OUTOF10: 4
PAINLEVEL_OUTOF10: 0
PAINLEVEL_OUTOF10: 5
PAINLEVEL_OUTOF10: 0

## 2023-01-01 ASSESSMENT — PAIN DESCRIPTION - ONSET
ONSET: ON-GOING
ONSET: PROGRESSIVE
ONSET: ON-GOING
ONSET: ON-GOING
ONSET: PROGRESSIVE

## 2023-01-01 ASSESSMENT — PAIN - FUNCTIONAL ASSESSMENT
PAIN_FUNCTIONAL_ASSESSMENT: PREVENTS OR INTERFERES SOME ACTIVE ACTIVITIES AND ADLS
PAIN_FUNCTIONAL_ASSESSMENT: NONE - DENIES PAIN
PAIN_FUNCTIONAL_ASSESSMENT: PREVENTS OR INTERFERES SOME ACTIVE ACTIVITIES AND ADLS

## 2023-01-01 ASSESSMENT — PAIN DESCRIPTION - FREQUENCY
FREQUENCY: CONTINUOUS
FREQUENCY: INTERMITTENT
FREQUENCY: INTERMITTENT
FREQUENCY: CONTINUOUS
FREQUENCY: CONTINUOUS

## 2023-01-01 ASSESSMENT — PAIN DESCRIPTION - LOCATION
LOCATION: LEG

## 2023-01-05 ENCOUNTER — OFFICE VISIT (OUTPATIENT)
Dept: PRIMARY CARE CLINIC | Age: 73
End: 2023-01-05
Payer: MEDICARE

## 2023-01-05 VITALS
HEART RATE: 66 BPM | HEIGHT: 70 IN | DIASTOLIC BLOOD PRESSURE: 70 MMHG | WEIGHT: 219.8 LBS | OXYGEN SATURATION: 97 % | TEMPERATURE: 97.8 F | BODY MASS INDEX: 31.47 KG/M2 | SYSTOLIC BLOOD PRESSURE: 134 MMHG

## 2023-01-05 DIAGNOSIS — J44.9 COPD, MILD (HCC): ICD-10-CM

## 2023-01-05 DIAGNOSIS — Z12.11 SCREENING FOR COLON CANCER: ICD-10-CM

## 2023-01-05 DIAGNOSIS — Z86.79 HX OF INTERMITTENT CLAUDICATION: ICD-10-CM

## 2023-01-05 DIAGNOSIS — R73.03 BORDERLINE DIABETES: ICD-10-CM

## 2023-01-05 DIAGNOSIS — Z12.5 SCREENING FOR PROSTATE CANCER: ICD-10-CM

## 2023-01-05 DIAGNOSIS — I10 ESSENTIAL HYPERTENSION: Primary | ICD-10-CM

## 2023-01-05 DIAGNOSIS — I73.9 PAD (PERIPHERAL ARTERY DISEASE) (HCC): ICD-10-CM

## 2023-01-05 LAB — HBA1C MFR BLD: 5.7 %

## 2023-01-05 PROCEDURE — 1036F TOBACCO NON-USER: CPT | Performed by: FAMILY MEDICINE

## 2023-01-05 PROCEDURE — 3017F COLORECTAL CA SCREEN DOC REV: CPT | Performed by: FAMILY MEDICINE

## 2023-01-05 PROCEDURE — 3078F DIAST BP <80 MM HG: CPT | Performed by: FAMILY MEDICINE

## 2023-01-05 PROCEDURE — G8484 FLU IMMUNIZE NO ADMIN: HCPCS | Performed by: FAMILY MEDICINE

## 2023-01-05 PROCEDURE — G8427 DOCREV CUR MEDS BY ELIG CLIN: HCPCS | Performed by: FAMILY MEDICINE

## 2023-01-05 PROCEDURE — 1123F ACP DISCUSS/DSCN MKR DOCD: CPT | Performed by: FAMILY MEDICINE

## 2023-01-05 PROCEDURE — 3023F SPIROM DOC REV: CPT | Performed by: FAMILY MEDICINE

## 2023-01-05 PROCEDURE — 99214 OFFICE O/P EST MOD 30 MIN: CPT | Performed by: FAMILY MEDICINE

## 2023-01-05 PROCEDURE — 3074F SYST BP LT 130 MM HG: CPT | Performed by: FAMILY MEDICINE

## 2023-01-05 PROCEDURE — 83036 HEMOGLOBIN GLYCOSYLATED A1C: CPT | Performed by: FAMILY MEDICINE

## 2023-01-05 PROCEDURE — G8417 CALC BMI ABV UP PARAM F/U: HCPCS | Performed by: FAMILY MEDICINE

## 2023-01-05 RX ORDER — AMLODIPINE BESYLATE 10 MG/1
TABLET ORAL
Qty: 90 TABLET | Refills: 3 | Status: SHIPPED | OUTPATIENT
Start: 2023-01-05

## 2023-01-05 RX ORDER — CARVEDILOL 12.5 MG/1
TABLET ORAL
Qty: 180 TABLET | Refills: 3 | Status: SHIPPED | OUTPATIENT
Start: 2023-01-05

## 2023-01-05 RX ORDER — LISINOPRIL 40 MG/1
TABLET ORAL
Qty: 90 TABLET | Refills: 3 | Status: SHIPPED | OUTPATIENT
Start: 2023-01-05

## 2023-01-05 SDOH — ECONOMIC STABILITY: FOOD INSECURITY: WITHIN THE PAST 12 MONTHS, YOU WORRIED THAT YOUR FOOD WOULD RUN OUT BEFORE YOU GOT MONEY TO BUY MORE.: NEVER TRUE

## 2023-01-05 SDOH — ECONOMIC STABILITY: FOOD INSECURITY: WITHIN THE PAST 12 MONTHS, THE FOOD YOU BOUGHT JUST DIDN'T LAST AND YOU DIDN'T HAVE MONEY TO GET MORE.: NEVER TRUE

## 2023-01-05 ASSESSMENT — PATIENT HEALTH QUESTIONNAIRE - PHQ9
SUM OF ALL RESPONSES TO PHQ QUESTIONS 1-9: 0
SUM OF ALL RESPONSES TO PHQ QUESTIONS 1-9: 0
2. FEELING DOWN, DEPRESSED OR HOPELESS: 0
1. LITTLE INTEREST OR PLEASURE IN DOING THINGS: 0
SUM OF ALL RESPONSES TO PHQ QUESTIONS 1-9: 0
SUM OF ALL RESPONSES TO PHQ9 QUESTIONS 1 & 2: 0
SUM OF ALL RESPONSES TO PHQ QUESTIONS 1-9: 0

## 2023-01-05 ASSESSMENT — SOCIAL DETERMINANTS OF HEALTH (SDOH): HOW HARD IS IT FOR YOU TO PAY FOR THE VERY BASICS LIKE FOOD, HOUSING, MEDICAL CARE, AND HEATING?: NOT HARD AT ALL

## 2023-01-05 NOTE — PROGRESS NOTES
[address] [facility] [address] PR[facility]ARE  [address] [place] [id number]  Dept: [phone]  Dept Fax: [phone]     1/5/2023      Jakub Bauhg   1950     Chief Complaint   Patient presents with    Check-Up       HPI  Pt comes in today for routine f/u. He has not acute concerns. He does need refills today. Reviewed health maintenance. BP Readings from Last 5 Encounters:   01/05/23 134/70   06/14/21 (!) 142/75   11/11/20 (!) 170/74   04/15/20 (!) 160/85   12/30/19 (!) 150/83     PHQ Scores 1/5/2023 6/14/2021 6/14/2021 11/11/2020 11/19/2019 10/23/2018 11/27/2017   PHQ2 Score 0 0 0 0 0 0 0   PHQ9 Score 0 0 0 0 0 0 0     Interpretation of Total Score Depression Severity: 1-4 = Minimal depression, 5-9 = Mild depression, 10-14 = Moderate depression, 15-19 = Moderately severe depression, 20-27 = Severe depression     Prior to Visit Medications    Medication Sig Taking?  Authorizing Provider   lisinopril (PRINIVIL;ZESTRIL) 40 MG tablet TAKE 1 TABLET BY MOUTH EVERY DAY No Aly Sprague, DO   carvedilol (COREG) 12.5 MG tablet TAKE 1 TABLET BY MOUTH TWICE A DAY Yes Darel Leventhal, DO   amLODIPine (NORVASC) 10 MG tablet TAKE 1 TABLET BY MOUTH EVERY DAY Yes Jack Sprague, DO   ZINC PO Take by mouth Yes Historical Provider, MD   albuterol sulfate HFA (PROAIR HFA) 108 (90 Base) MCG/ACT inhaler Inhale 2 puffs into the lungs every 6 hours as needed for Wheezing Yes Darel Leventhal, DO   THIAMINE HCL PO Take by mouth Yes Historical Provider, MD   Ascorbic Acid (VITAMIN C) 100 MG tablet Take 100 mg by mouth daily Yes Historical Provider, MD   aspirin 325 MG tablet Take 325 mg by mouth daily Yes Historical Provider, MD   VITAMIN D, CHOLECALCIFEROL, PO Take by mouth daily Yes Historical Provider, MD   acetaminophen (TYLENOL) 500 MG tablet Take 1,000 mg by mouth every 6 hours as needed Yes Historical Provider, MD   Omega-3 Fatty Acids (FISH OIL) 1000 MG CAPS Take 3,000 mg by mouth daily Yes Historical Provider, MD   B Complex Vitamins (B COMPLEX PO) Take by mouth Yes Historical Provider, MD   Multiple Vitamins-Minerals (MULTIVITAMIN ADULTS PO) Take by mouth Yes Historical Provider, MD       Past Medical History:   Diagnosis Date    COPD (chronic obstructive pulmonary disease) ([address]     Full dentures     History of basal cell carcinoma     History of blood transfusion     History of gout 2020    Hx of blood clots     Arterial blockage in RLE    Hyperlipidemia     Hypertension     Primary osteoarthritis of right knee         Social History     Tobacco Use    Smoking status: Former     Packs/day: 1.50     Years: 20.00     Pack years: 30.00     Types: Cigarettes     Quit date: 1994     Years since quittin.1    Smokeless tobacco: Never   Vaping Use    Vaping Use: Never used   Substance Use Topics    Alcohol use: Yes     Comment: Few times per week - beer    Drug use: No        Past Surgical History:   Procedure Laterality Date    ANGIOPLASTY      RLE    CAROTID STENT PLACEMENT  2016    MOHS SURGERY  2019    Inferior forehead    UPPER GASTROINTESTINAL ENDOSCOPY  10/17/2017    Dr. 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 Cristina Guevraa - few tiny antral erosions, biopsies done for H pylori, large varices in cardia        Allergies   Allergen Reactions    Pcn [Penicillins] Hives        Family History   Problem Relation Age of Onset    No Known Problems Mother     High Blood Pressure Father         Patient's past medical history, surgical history, family history, medications, and allergies  were all reviewed and updated as appropriate today. Review of Systems   Constitutional:  Negative for fever. HENT:  Negative for ear pain and sore throat. Respiratory:  Negative for cough and shortness of breath. Cardiovascular:  Negative for chest pain. Gastrointestinal:  Negative for abdominal pain and nausea. Skin:  Negative for rash. Neurological:  Negative for dizziness and headaches. Hematological:  Negative for adenopathy. /70 (Cuff Size: Medium Adult)   Pulse 66   Temp 97.8 °F (36.6 °C) (Oral)   Ht 5' 10\" (1.778 m)   Wt 219 lb 12.8 oz (99.7 kg)   SpO2 97% Comment: room air  BMI 31.54 kg/m²      Physical Exam  Vitals reviewed. Constitutional:       General: He is not in acute distress. HENT:      Head: Normocephalic. Nose: Nose normal.      Mouth/Throat:      Mouth: Mucous membranes are moist.   Eyes:      Extraocular Movements: Extraocular movements intact. Pupils: Pupils are equal, round, and reactive to light. Cardiovascular:      Rate and Rhythm: Normal rate and regular rhythm. Heart sounds: No murmur heard. Pulmonary:      Effort: Pulmonary effort is normal.      Breath sounds: Normal breath sounds. No wheezing. Abdominal:      General: Bowel sounds are normal.      Palpations: Abdomen is soft. Tenderness: There is no abdominal tenderness. Musculoskeletal:         General: No swelling or deformity. Cervical back: Neck supple. Lymphadenopathy:      Cervical: No cervical adenopathy. Skin:     General: Skin is warm and dry. Findings: No rash. Neurological:      Mental Status: He is alert and oriented to person, place, and time. Cranial Nerves: No cranial nerve deficit. Psychiatric:         Mood and Affect: Mood normal.         Behavior: Behavior is cooperative. Assessment:  Encounter Diagnoses   Name Primary? Essential hypertension Yes    COPD, mild (HCC)     PAD (peripheral artery disease) ([facility]     Borderline diabetes     Screening for prostate cancer     Screening for colon cancer        Plan:  1. Essential hypertension  Controlled. Refills given. - CBC with Auto Differential; Future  - Comprehensive Metabolic Panel, Fasting; Future  - Lipid, Fasting;  Future  - lisinopril (PRINIVIL;ZESTRIL) 40 MG tablet; TAKE 1 TABLET BY MOUTH EVERY DAY  Dispense: 90 tablet; Refill: 3  - carvedilol (COREG) 12.5 MG tablet; TAKE 1 TABLET BY MOUTH TWICE A DAY  Dispense: 180 tablet; Refill: 3  - amLODIPine (NORVASC) 10 MG tablet; TAKE 1 TABLET BY MOUTH EVERY DAY  Dispense: 90 tablet; Refill: 3    2. COPD, mild ([address]  Stable. 3. PAD (peripheral artery disease) (HCC)  Reports symptoms likely 2/2 claudication. Precautions given. Declines re-eval now. 4. Borderline diabetes  - POCT glycosylated hemoglobin (Hb A1C)  - Lipid, Fasting; Future    5. Screening for prostate cancer  Discussed prostate cancer screening with male of appropriate age and risk factors. I have provided evidence behind PSA and answered all questions regarding the sensitivity of this test.  At this time, through shared decision making, patient would like to move forward with collection of annual PSA.  - PSA Screening; Future    6. Screening for colon cancer  Discussed colon cancer screening options relating to patient's personal and family risk. Patient is interested in getting a referral for colonoscopy. This information has been provided and he is to call to get that visit set up. - AGATA - Lulú Cardoso MD, Gastroenterology, [facility]    Total time spent: Over 30 minutes. This includes time spent with the patient during the visit as well as time spent before and after the visit reviewing the chart, reviewing past/present studies and documenting the encounter. No follow-ups on file. Chanel Davis, DO     Please note that this chart was generated using dragon dictation software. Although every effort was made to ensure the accuracy of this automated transcription, some errors in transcription may have occurred.

## 2023-01-07 ASSESSMENT — ENCOUNTER SYMPTOMS
SHORTNESS OF BREATH: 0
ABDOMINAL PAIN: 0
NAUSEA: 0
COUGH: 0
SORE THROAT: 0

## 2023-06-08 ENCOUNTER — TELEPHONE (OUTPATIENT)
Dept: PRIMARY CARE CLINIC | Age: 73
End: 2023-06-08

## 2023-06-08 NOTE — TELEPHONE ENCOUNTER
Call for nurse triage:  vomiting, fatigue    Spoke with patient, this morning at 3:30 am started vomiting, vomited 4 times last time being at 8:00 am.  States lately he has been feeling more fatigued after work. He has been experiencing loss of appetite for months. He has lost weight. Asked if he ate any food that may have been spoiled, he denies this. He states he felt fine before he started vomiting. He would rather come in tomorrow due to possibility of vomiting again. Appointment made for Ms Kareem Lincoln.  For Friday

## 2023-06-09 ENCOUNTER — OFFICE VISIT (OUTPATIENT)
Dept: PRIMARY CARE CLINIC | Age: 73
End: 2023-06-09
Payer: MEDICARE

## 2023-06-09 VITALS
HEIGHT: 70 IN | WEIGHT: 194 LBS | BODY MASS INDEX: 27.77 KG/M2 | TEMPERATURE: 96.7 F | DIASTOLIC BLOOD PRESSURE: 92 MMHG | SYSTOLIC BLOOD PRESSURE: 138 MMHG | HEART RATE: 109 BPM | OXYGEN SATURATION: 97 %

## 2023-06-09 DIAGNOSIS — R63.0 LOSS OF APPETITE: ICD-10-CM

## 2023-06-09 DIAGNOSIS — R11.2 NAUSEA AND VOMITING, UNSPECIFIED VOMITING TYPE: Primary | ICD-10-CM

## 2023-06-09 DIAGNOSIS — R63.4 WEIGHT LOSS, UNINTENTIONAL: ICD-10-CM

## 2023-06-09 DIAGNOSIS — R53.81 MALAISE AND FATIGUE: ICD-10-CM

## 2023-06-09 DIAGNOSIS — R53.83 MALAISE AND FATIGUE: ICD-10-CM

## 2023-06-09 PROCEDURE — 1123F ACP DISCUSS/DSCN MKR DOCD: CPT | Performed by: NURSE PRACTITIONER

## 2023-06-09 PROCEDURE — 99214 OFFICE O/P EST MOD 30 MIN: CPT | Performed by: NURSE PRACTITIONER

## 2023-06-09 PROCEDURE — 3017F COLORECTAL CA SCREEN DOC REV: CPT | Performed by: NURSE PRACTITIONER

## 2023-06-09 PROCEDURE — G8427 DOCREV CUR MEDS BY ELIG CLIN: HCPCS | Performed by: NURSE PRACTITIONER

## 2023-06-09 PROCEDURE — G8417 CALC BMI ABV UP PARAM F/U: HCPCS | Performed by: NURSE PRACTITIONER

## 2023-06-09 PROCEDURE — 1036F TOBACCO NON-USER: CPT | Performed by: NURSE PRACTITIONER

## 2023-06-09 PROCEDURE — 3075F SYST BP GE 130 - 139MM HG: CPT | Performed by: NURSE PRACTITIONER

## 2023-06-09 PROCEDURE — 3080F DIAST BP >= 90 MM HG: CPT | Performed by: NURSE PRACTITIONER

## 2023-06-09 SDOH — ECONOMIC STABILITY: HOUSING INSECURITY
IN THE LAST 12 MONTHS, WAS THERE A TIME WHEN YOU DID NOT HAVE A STEADY PLACE TO SLEEP OR SLEPT IN A SHELTER (INCLUDING NOW)?: NO

## 2023-06-09 SDOH — ECONOMIC STABILITY: FOOD INSECURITY: WITHIN THE PAST 12 MONTHS, YOU WORRIED THAT YOUR FOOD WOULD RUN OUT BEFORE YOU GOT MONEY TO BUY MORE.: NEVER TRUE

## 2023-06-09 SDOH — ECONOMIC STABILITY: FOOD INSECURITY: WITHIN THE PAST 12 MONTHS, THE FOOD YOU BOUGHT JUST DIDN'T LAST AND YOU DIDN'T HAVE MONEY TO GET MORE.: NEVER TRUE

## 2023-06-09 SDOH — ECONOMIC STABILITY: INCOME INSECURITY: HOW HARD IS IT FOR YOU TO PAY FOR THE VERY BASICS LIKE FOOD, HOUSING, MEDICAL CARE, AND HEATING?: NOT HARD AT ALL

## 2023-06-09 ASSESSMENT — ENCOUNTER SYMPTOMS
VOMITING: 1
ABDOMINAL PAIN: 0
SHORTNESS OF BREATH: 0
SORE THROAT: 0
COUGH: 0
NAUSEA: 1

## 2023-06-09 NOTE — PROGRESS NOTES
depression, 20-27 = Severe depression     Prior to Visit Medications    Medication Sig Taking?  Authorizing Provider   lisinopril (PRINIVIL;ZESTRIL) 40 MG tablet TAKE 1 TABLET BY MOUTH EVERY DAY  Sindi Gabriel DO   carvedilol (COREG) 12.5 MG tablet TAKE 1 TABLET BY MOUTH TWICE A DAY  Sindi Gabriel,    amLODIPine (NORVASC) 10 MG tablet TAKE 1 TABLET BY MOUTH EVERY DAY  Guillaume Sprague, DO   ZINC PO Take by mouth  Historical Provider, MD   albuterol sulfate HFA (PROAIR HFA) 108 (90 Base) MCG/ACT inhaler Inhale 2 puffs into the lungs every 6 hours as needed for Wheezing  Sindi Gabriel DO   THIAMINE HCL PO Take by mouth  Historical Provider, MD   Ascorbic Acid (VITAMIN C) 100 MG tablet Take 100 mg by mouth daily  Historical Provider, MD   aspirin 325 MG tablet Take 325 mg by mouth daily  Historical Provider, MD   VITAMIN D, CHOLECALCIFEROL, PO Take by mouth daily  Historical Provider, MD   acetaminophen (TYLENOL) 500 MG tablet Take 1,000 mg by mouth every 6 hours as needed  Historical Provider, MD   Omega-3 Fatty Acids (FISH OIL) 1000 MG CAPS Take 3,000 mg by mouth daily  Historical Provider, MD   B Complex Vitamins (B COMPLEX PO) Take by mouth  Historical Provider, MD   Multiple Vitamins-Minerals (MULTIVITAMIN ADULTS PO) Take by mouth  Historical Provider, MD       Past Medical History:   Diagnosis Date    COPD (chronic obstructive pulmonary disease) ([facility]     Full dentures     History of basal cell carcinoma     History of blood transfusion     History of gout 2020    Hx of blood clots     Arterial blockage in RLE    Hyperlipidemia     Hypertension     Primary osteoarthritis of right knee         Social History     Tobacco Use    Smoking status: Former     Packs/day: 1.50     Years: 20.00     Pack years: 30.00     Types: Cigarettes     Quit date: 1994     Years since quittin.5    Smokeless tobacco: Never   Vaping Use    Vaping Use: Never used   Substance

## 2023-06-12 NOTE — RESULT ENCOUNTER NOTE
Please let pt know I have reviewed labs. I know I see him later this week. Looked like he was a little dehydrated at that time.  Just make sure eating/drinking well and we will see him then

## 2023-06-16 PROBLEM — K74.69 OTHER CIRRHOSIS OF LIVER (HCC): Status: ACTIVE | Noted: 2023-01-01

## 2023-06-16 PROBLEM — A41.9 SEPSIS (HCC): Status: ACTIVE | Noted: 2023-01-01

## 2023-06-16 PROBLEM — Z87.891 FORMER CIGARETTE SMOKER: Status: ACTIVE | Noted: 2023-01-01

## 2023-06-17 PROBLEM — K86.3 PANCREATIC PSEUDOCYST: Status: ACTIVE | Noted: 2023-01-01

## 2023-06-19 NOTE — FLOWSHEET NOTE
Dr. Cosme Bhakta at bedside to see. Patient is breathing much more comfortably now. Taken off BIPAP and placed back on nasal o2 at 5 liters.

## 2023-06-19 NOTE — FLOWSHEET NOTE
Perfect Serve message to Dr. Matilde Lundberg to make aware of respiratory issues post EGD. He will have medical residents to see.

## 2023-06-19 NOTE — ANESTHESIA POSTPROCEDURE EVALUATION
Department of Anesthesiology  Postprocedure Note    Patient: Richard Chan  MRN: [id number]  YOB: 1950  Date of evaluation: 6/19/2023      Procedure Summary     Date: 06/19/23 Room / Location: [address] [place] [address] Anamika Chow 01 / [facility]    Anesthesia Start: 1524 Anesthesia Stop: 4026    Procedure: EGD BIOPSY Diagnosis:       Hepatic cirrhosis, unspecified hepatic cirrhosis type, unspecified whether ascites present ([facility]      (Hepatic cirrhosis, unspecified hepatic cirrhosis type, unspecified whether ascites present ([facility] [K74.60])    Surgeons: Aramis Reeder MD Responsible Provider: Allison Camarena DO    Anesthesia Type: general ASA Status: 3          Anesthesia Type: No value filed. Keon Phase I: Keon Score: 6    Keon Phase II:        Anesthesia Post Evaluation    Patient location during evaluation: PACU  Patient participation: complete - patient participated  Level of consciousness: awake and alert  Airway patency: patent  Nausea & Vomiting: no nausea and no vomiting  Cardiovascular status: blood pressure returned to baseline  Respiratory status: acceptable  Hydration status: euvolemic  Comments: Pt was in obvious respiratory distress following extubation in PACU. Following bipap and IV lasix, his breathing was drastically improved. He was weaned to 5L nasal O2 before being transferred back to PCU.

## 2023-06-19 NOTE — FLOWSHEET NOTE
RT, Siva to bedside for BIPAP per Dr. Quentin Kidd' request. Carlitos Walls on BIPAP 14/8, fio2 55%. Will also give lasix dose.

## 2023-06-19 NOTE — ANESTHESIA PRE PROCEDURE
Department of Anesthesiology  Preprocedure Note       Name:  Martha Sales   Age:  72 y.o.  :  1950                                          MRN:  [id number]         Date:  2023      Surgeon: Roel Goldstein):  Sophie Richard MD    Procedure: Procedure(s):  EGD DIAGNOSTIC ONLY    Medications prior to admission:   Prior to Admission medications    Medication Sig Start Date End Date Taking?  Authorizing Provider   lisinopril (PRINIVIL;ZESTRIL) 40 MG tablet TAKE 1 TABLET BY MOUTH EVERY DAY 23   Connor Sprague DO   carvedilol (COREG) 12.5 MG tablet TAKE 1 TABLET BY MOUTH TWICE A DAY 23   Connor Sprague DO   amLODIPine (NORVASC) 10 MG tablet TAKE 1 TABLET BY MOUTH EVERY DAY 23   Connor Sprague DO   Ascorbic Acid (VITAMIN C) 100 MG tablet Take 1 tablet by mouth daily    Historical Provider, MD   aspirin 325 MG tablet Take 1 tablet by mouth daily    Historical Provider, MD   VITAMIN D, CHOLECALCIFEROL, PO Take by mouth daily    Historical Provider, MD   Omega-3 Fatty Acids (FISH OIL) 1000 MG CAPS Take 3 capsules by mouth daily    Historical Provider, MD   B Complex Vitamins (B COMPLEX PO) Take by mouth    Historical Provider, MD   Multiple Vitamins-Minerals (MULTIVITAMIN ADULTS PO) Take by mouth    Historical Provider, MD       Current medications:    Current Facility-Administered Medications   Medication Dose Route Frequency Provider Last Rate Last Admin    pantoprazole (PROTONIX) injection 40 mg  40 mg IntraVENous BID Mikey Bella DO   40 mg at 23 0823    lidocaine-EPINEPHrine 1 [id number] injection 20 mL  20 mL IntraDERmal Once Leidy Gomez MD        [Held by provider] amLODIPine (NORVASC) tablet 10 mg  10 mg Oral Daily Elie Garcia MD   10 mg at 23 1010    [Held by provider] carvedilol (COREG) tablet 12.5 mg  12.5 mg Oral BID Elie Garcia MD   12.5 mg at 23 1010    [Held by provider] lisinopril (PRINIVIL;ZESTRIL) tablet 40

## 2023-06-19 NOTE — PROCEDURES
[address] [place] [address]  Endoscopy Note    Patient: Sanam Leo  : 1950  Acct#:     Procedure: Esophagogastroduodenoscopy with biopsy    Date:  2023     Surgeon:  Felton Lynch MD      Anesthesia:    General anesthesia per anesthesia [id number]      EBL: <50 mL    Indications:  abnormal imaging GI tract     Description of Procedure:    Informed consent was obtained from the patient after explanation of indications, benefits and possible risks and complications of the procedure. The patient was then taken to the endoscopy suite, placed in the left lateral decubitus position and the above IV sedation was administrered. The Olympus videoendoscope (GIF-[id number]) was placed in the patient's mouth and under direct visualization passed into the esophagus. The scope was then advanced into the stomach and to the second portion of the duodenum. A retroflexed exam of the gastric cardia and fundus was performed. The scope was then withdrawn back into the stomach, it was decompressed, and the scope was completely withdrawn. Findings:  Normal 2nd portion of the duodenum. A few small cratered ulcers in the duodenal bulb. One small 5 mm ulcer in the bulb was quite deep. A few small ulcers in the gastric antrum with surrounding erythema. Biopsies were obtained evaluate for H. pylori. Normal esophagus. No esophageal or gastric varices       The patient tolerated the procedure well and was taken to the post anesthesia care unit in good condition. Biopsies: Yes    Impression:    Normal 2nd portion of the duodenum. A few small cratered ulcers in the duodenal bulb. One small 5 mm ulcer in the bulb was quite deep. A few small ulcers in the gastric antrum with surrounding erythema. Biopsies were obtained evaluate for H. pylori. Normal esophagus. No esophageal or gastric varices    Recommendations:   Await pathology results. PPI daily.   Upper GI series with Gastrografin to further evaluate

## 2023-06-19 NOTE — FLOWSHEET NOTE
Medical Resident, Russell Certain here to see patient. Updated on status. Orders received. Will get CXRAY and ABG before sending back to room.

## 2023-06-20 NOTE — CARE COORDINATION
CM following. Pt down to 1 L O2, none at baseline, paracentesis pending. Daughter to transport at discharge. No CM needs anticipated.      Dm Singh RN, BSN, [address]  Case Management Department  [phone]

## 2023-06-21 NOTE — FLOWSHEET NOTE
Dr. Marino Hall made aware of pt's vitals below:     06/21/23 0705   Vital Signs   Pulse (!) 124   Heart Rate Source Monitor   Respirations 16   BP 99/76   MAP (Calculated) 84   BP Location Right upper arm   BP Method Automatic   Patient Position Semi fowlers   Level of Consciousness 0   MEWS Score 4       Pt denies having pain, SOB, dizziness, light-headedness. Pt a/o x4.

## 2023-06-22 PROBLEM — N17.9 AKI (ACUTE KIDNEY INJURY) (HCC): Status: ACTIVE | Noted: 2023-01-01

## 2023-06-22 PROBLEM — Z51.5 ENCOUNTER FOR PALLIATIVE CARE: Status: ACTIVE | Noted: 2023-01-01

## 2023-06-22 PROBLEM — R65.21 SEPTIC SHOCK (HCC): Status: ACTIVE | Noted: 2023-01-01

## 2023-06-22 PROBLEM — K74.60 HEPATIC CIRRHOSIS (HCC): Status: ACTIVE | Noted: 2023-01-01

## 2023-06-22 PROBLEM — A41.9 SEPSIS (HCC): Status: ACTIVE | Noted: 2023-01-01

## 2023-06-22 NOTE — PROCEDURES
Ximena Fonseca is a 72 y.o. male patient. 1. Pancreatic pseudocyst    2. Hepatic cirrhosis, unspecified hepatic cirrhosis type, unspecified whether ascites present ([facility]    3. Sepsis, due to unspecified organism, unspecified whether acute organ dysfunction present [facility])      Past Medical History:   Diagnosis Date    COPD (chronic obstructive pulmonary disease) ([facility]     Full dentures     History of basal cell carcinoma     History of blood transfusion     History of gout 11/11/2020    Hx of blood clots     Arterial blockage in RLE    Hyperlipidemia     Hypertension     Other cirrhosis of liver ([facility] 6/16/2023    Primary osteoarthritis of right knee      Blood pressure (!) 79/54, pulse (!) 103, temperature (!) 96 °F (35.6 °C), temperature source Rectal, resp. rate (!) 37, height 5' 10\" (1.778 m), weight 194 lb 14.2 oz (88.4 kg), SpO2 98 %. Central Line    Date/Time: 6/22/2023 1:37 AM  Performed by: Fiorella Farias MD  Authorized by: Damien Rm MD   Consent: The procedure was performed in an emergent situation. Verbal consent obtained. Risks and benefits: risks, benefits and alternatives were discussed  Consent given by: patient  Patient understanding: patient states understanding of the procedure being performed  Patient consent: the patient's understanding of the procedure matches consent given  Procedure consent: procedure consent matches procedure scheduled  Relevant documents: relevant documents present and verified  Test results: test results available and properly labeled  Site marked: the operative site was marked  Imaging studies: imaging studies available  Patient identity confirmed: verbally with patient and arm band  Time out: Immediately prior to procedure a \"time out\" was called to verify the correct patient, procedure, equipment, support staff and site/side marked as required.   Indications: vascular access    Anesthesia:  Local Anesthetic: lidocaine 1% with epinephrine    Sedation:  Patient

## 2023-06-22 NOTE — PROCEDURES
Mickie Paulino is a 72 y.o. male patient. 1. Pancreatic pseudocyst    2. Hepatic cirrhosis, unspecified hepatic cirrhosis type, unspecified whether ascites present [facility])      Past Medical History:   Diagnosis Date    COPD (chronic obstructive pulmonary disease) ([facility]     Full dentures     History of basal cell carcinoma     History of blood transfusion     History of gout 11/11/2020    Hx of blood clots     Arterial blockage in RLE    Hyperlipidemia     Hypertension     Other cirrhosis of liver ([facility] 6/16/2023    Primary osteoarthritis of right knee      Blood pressure (!) 79/54, pulse (!) 103, temperature (!) 96 °F (35.6 °C), temperature source Rectal, resp. rate (!) 37, height 5' 10\" (1.778 m), weight 194 lb 14.2 oz (88.4 kg), SpO2 (!) 87 %. Insert Arterial Line    Date/Time: 6/21/2023 11:16 PM  Performed by: Janeth Vidal DO  Authorized by: Lise Cali MD   Consent: The procedure was performed in an emergent situation. Relevant documents: relevant documents present and verified  Test results: test results available and properly labeled  Site marked: the operative site was marked  Imaging studies: imaging studies available  Required items: required blood products, implants, devices, and special equipment available  Patient identity confirmed: arm band  Time out: Immediately prior to procedure a \"time out\" was called to verify the correct patient, procedure, equipment, support staff and site/side marked as required. Preparation: Patient was prepped and draped in the usual sterile fashion.   Indications: hemodynamic monitoring  Location: right radial    Sedation:  Patient sedated: no    Travis's test normal: yes  Needle gauge: 20  Seldinger technique: Seldinger technique used  Number of attempts: 1  Post-procedure: line sutured and dressing applied  Post-procedure CMS: normal  Patient tolerance: patient tolerated the procedure well with no immediate complications  Comments: EBL less than

## 2023-06-22 NOTE — PLAN OF CARE
Called in the PACU to evaluate the patient as patient was on BIPAP after EGD. On my evaluation patient was off BIPAP and on 6-L NC. Patient was awake alert x 3. Endorsing shortness of breath.      Plan     1  Will get portable CXR   2  ABG ordered       Discussed with the on call team.       Gabriel Guzman MD   Internal Medicine, PGY-II
No complaint of pain. To discharge home with family. Did a flouride test of UGI. Will get paracentesis tomorrow. Diet advanced.      Problem: Pain  Goal: Verbalizes/displays adequate comfort level or baseline comfort level  Outcome: Progressing     Problem: Discharge Planning  Goal: Discharge to home or other facility with appropriate resources  Outcome: Progressing     Problem: Gastrointestinal - Adult  Goal: Minimal or absence of nausea and vomiting  Outcome: Progressing     Problem: Respiratory - Adult  Goal: Achieves optimal ventilation and oxygenation  Outcome: Progressing
Problem: Discharge Planning  Goal: Discharge to home or other facility with appropriate resources  6/22/2023 1634 by Tripp Desouza RN  Outcome: Not Progressing     Problem: Pain  Goal: Verbalizes/displays adequate comfort level or baseline comfort level  6/22/2023 1634 by Tripp Desouza RN  Outcome: Not Progressing  6/22/2023 0836 by Kimi Rogers RN  Outcome: Progressing  Flowsheets (Taken 6/22/2023 0000)  Verbalizes/displays adequate comfort level or baseline comfort level:   Encourage patient to monitor pain and request assistance   Assess pain using appropriate pain scale   Administer analgesics based on type and severity of pain and evaluate response     Problem: Discharge Planning  Goal: Discharge to home or other facility with appropriate resources  6/22/2023 1634 by Tripp Desouza RN  Outcome: Not Progressing  6/22/2023 0836 by Kimi Rogers RN  Outcome: Progressing  Flowsheets  Taken 6/22/2023 0800 by Tripp Desouza RN  Discharge to home or other facility with appropriate resources:   Identify barriers to discharge with patient and caregiver   Arrange for needed discharge resources and transportation as appropriate   Identify discharge learning needs (meds, wound care, etc)   Arrange for interpreters to assist at discharge as needed   Refer to discharge planning if patient needs post-hospital services based on physician order or complex needs related to functional status, cognitive ability or social support system  Taken 6/22/2023 0000 by Kimi Rogers RN  Discharge to home or other facility with appropriate resources:   Identify barriers to discharge with patient and caregiver   Arrange for needed discharge resources and transportation as appropriate   Identify discharge learning needs (meds, wound care, etc)     Problem: Safety - Adult  Goal: Free from fall injury  6/22/2023 1634 by Tripp Desouza RN  Outcome: Not Progressing  6/22/2023 0836 by Kimi Rogers
Problem: Pain  Goal: Verbalizes/displays adequate comfort level or baseline comfort level  6/19/2023 2129 by Mariam Pedraza RN  Outcome: Progressing  6/19/2023 2000 by Lee Mosqueda RN  Outcome: Progressing  Flowsheets (Taken 6/19/2023 1830)  Verbalizes/displays adequate comfort level or baseline comfort level:   Encourage patient to monitor pain and request assistance   Assess pain using appropriate pain scale   Administer analgesics based on type and severity of pain and evaluate response   Implement non-pharmacological measures as appropriate and evaluate response   Notify Licensed Independent Practitioner if interventions unsuccessful or patient reports new pain  Note: Patient denies pain throughout the shift. Problem: Discharge Planning  Goal: Discharge to home or other facility with appropriate resources  6/19/2023 2129 by Mariam Pedraza RN  Outcome: Progressing  6/19/2023 2000 by Lee Mosqueda RN  Outcome: Susyflo Linda (Taken 6/19/2023 1830)  Discharge to home or other facility with appropriate resources: Identify barriers to discharge with patient and caregiver     Problem: Safety - Adult  Goal: Free from fall injury  6/19/2023 2129 by Maraim Pedraza RN  Outcome: Progressing  6/19/2023 2000 by Lee Mosqueda RN  Outcome: Poojalita Mckeon (Taken 6/19/2023 1830)  Free From Fall Injury: Instruct family/caregiver on patient safety  Note: Patient in lowest position, bed brakes locked, bed alarm in placed, call light within reach, and encouraged to call for assistance.       Problem: Gastrointestinal - Adult  Goal: Minimal or absence of nausea and vomiting  6/19/2023 2129 by Mariam Pedraza RN  Outcome: Progressing  6/19/2023 2000 by Lee Mosqueda RN  Flowsheets (Taken 6/19/2023 1830)  Minimal or absence of nausea and vomiting:   Administer IV fluids as ordered to ensure adequate hydration   Maintain NPO status until nausea and vomiting are resolved   Administer
Problem: Pain  Goal: Verbalizes/displays adequate comfort level or baseline comfort level  6/21/2023 0238 by Kingsley Scott  Outcome: Progressing  Flowsheets (Taken 6/21/2023 0238)  Verbalizes/displays adequate comfort level or baseline comfort level:   Encourage patient to monitor pain and request assistance   Administer analgesics based on type and severity of pain and evaluate response   Consider cultural and social influences on pain and pain management   Assess pain using appropriate pain scale   Implement non-pharmacological measures as appropriate and evaluate response   Notify Licensed Independent Practitioner if interventions unsuccessful or patient reports new pain  Note: Pt has denied pain during shift. Will continue to monitor pt's pain level. Problem: Discharge Planning  Goal: Discharge to home or other facility with appropriate resources  6/21/2023 0238 by Kingsley Scott  Outcome: Progressing  Flowsheets (Taken 6/21/2023 0238)  Discharge to home or other facility with appropriate resources:   Identify barriers to discharge with patient and caregiver   Identify discharge learning needs (meds, wound care, etc)   Arrange for needed discharge resources and transportation as appropriate     Problem: Safety - Adult  Goal: Free from fall injury  Outcome: Progressing  Flowsheets (Taken 6/21/2023 0238)  Free From Fall Injury: Instruct family/caregiver on patient safety  Note: Pt will remain free from accidental injury this shift. Pt has fall risk measures (fall risk bracelet, fall risk sign, fall risk cloth, non-slip socks, dome light on) in place. Pt bed is in low position, bed alarm on, bed wheels locked, call light within reach, bedside table within reach, chair wheels locked, chair alarm on.       Problem: Gastrointestinal - Adult  Goal: Minimal or absence of nausea and vomiting  6/21/2023 0238 by Kingsley Scott  Outcome: Progressing  Flowsheets (Taken 6/21/2023 0238)  Minimal or absence of nausea and
Problem: Pain  Goal: Verbalizes/displays adequate comfort level or baseline comfort level  Outcome: Not Progressing  Flowsheets (Taken 6/21/2023 0640 by Joseph Howard RN)  Verbalizes/displays adequate comfort level or baseline comfort level:   Encourage patient to monitor pain and request assistance   Assess pain using appropriate pain scale     Problem: Discharge Planning  Goal: Discharge to home or other facility with appropriate resources  Outcome: Not Progressing     Problem: Safety - Adult  Goal: Free from fall injury  Outcome: Not Progressing  Flowsheets (Taken 6/21/2023 1026)  Free From Fall Injury:   Instruct family/caregiver on patient safety   Based on caregiver fall risk screen, instruct family/caregiver to ask for assistance with transferring infant if caregiver noted to have fall risk factors     Problem: Gastrointestinal - Adult  Goal: Minimal or absence of nausea and vomiting  Outcome: Not Progressing  Flowsheets (Taken 6/21/2023 0643 by Joseph Howard RN)  Minimal or absence of nausea and vomiting: Administer IV fluids as ordered to ensure adequate hydration     Problem: Cardiovascular - Adult  Goal: Maintains optimal cardiac output and hemodynamic stability  Outcome: Not Progressing  Flowsheets (Taken 6/21/2023 0643 by Joseph Howard RN)  Maintains optimal cardiac output and hemodynamic stability: Monitor blood pressure and heart rate     Problem: Nutrition Deficit:  Goal: Optimize nutritional status  Outcome: Not Progressing     Problem: Respiratory - Adult  Goal: Achieves optimal ventilation and oxygenation  Outcome: Not Progressing  Flowsheets (Taken 6/21/2023 0643 by Joseph Howard RN)  Achieves optimal ventilation and oxygenation:   Assess for changes in respiratory status   Assess for changes in mentation and behavior   Position to facilitate oxygenation and minimize respiratory effort     Problem: ABCDS Injury Assessment  Goal: Absence of physical injury  Outcome: Not Progressing  Flowsheets (Taken 6/21/2023
Problem: Pain  Goal: Verbalizes/displays adequate comfort level or baseline comfort level  Outcome: Progressing  Flowsheets (Taken 6/19/2023 0125)  Verbalizes/displays adequate comfort level or baseline comfort level:   Encourage patient to monitor pain and request assistance   Assess pain using appropriate pain scale   Administer analgesics based on type and severity of pain and evaluate response   Implement non-pharmacological measures as appropriate and evaluate response   Consider cultural and social influences on pain and pain management   Notify Licensed Independent Practitioner if interventions unsuccessful or patient reports new pain     Problem: Discharge Planning  Goal: Discharge to home or other facility with appropriate resources  Outcome: Progressing  Flowsheets (Taken 6/19/2023 0125)  Discharge to home or other facility with appropriate resources:   Identify barriers to discharge with patient and caregiver   Arrange for needed discharge resources and transportation as appropriate   Identify discharge learning needs (meds, wound care, etc)   Arrange for interpreters to assist at discharge as needed   Refer to discharge planning if patient needs post-hospital services based on physician order or complex needs related to functional status, cognitive ability or social support system     Problem: Safety - Adult  Goal: Free from fall injury  Outcome: Progressing  Flowsheets (Taken 6/19/2023 0125)  Free From Fall Injury: Instruct family/caregiver on patient safety     Problem: Gastrointestinal - Adult  Goal: Minimal or absence of nausea and vomiting  Outcome: Progressing  Flowsheets (Taken 6/19/2023 0125)  Minimal or absence of nausea and vomiting:   Nasogastric tube to low intermittent suction as ordered   Administer IV fluids as ordered to ensure adequate hydration   Provide nonpharmacologic comfort measures as appropriate   Nutrition consult to assist patient with adequate nutrition and appropriate food
Problem: Pain  Goal: Verbalizes/displays adequate comfort level or baseline comfort level  Outcome: Progressing  Flowsheets (Taken 6/22/2023 0000)  Verbalizes/displays adequate comfort level or baseline comfort level:   Encourage patient to monitor pain and request assistance   Assess pain using appropriate pain scale   Administer analgesics based on type and severity of pain and evaluate response     Problem: Discharge Planning  Goal: Discharge to home or other facility with appropriate resources  Outcome: Progressing  Flowsheets (Taken 6/22/2023 0000)  Discharge to home or other facility with appropriate resources:   Identify barriers to discharge with patient and caregiver   Arrange for needed discharge resources and transportation as appropriate   Identify discharge learning needs (meds, wound care, etc)     Problem: Safety - Adult  Goal: Free from fall injury  Outcome: Progressing  Flowsheets (Taken 6/22/2023 0834)  Free From Fall Injury: Instruct family/caregiver on patient safety     Problem: Gastrointestinal - Adult  Goal: Minimal or absence of nausea and vomiting  Outcome: Progressing  Flowsheets (Taken 6/22/2023 0000)  Minimal or absence of nausea and vomiting:   Administer IV fluids as ordered to ensure adequate hydration   Maintain NPO status until nausea and vomiting are resolved     Problem: Cardiovascular - Adult  Goal: Maintains optimal cardiac output and hemodynamic stability  Outcome: Progressing  Flowsheets (Taken 6/22/2023 0000)  Maintains optimal cardiac output and hemodynamic stability:   Monitor blood pressure and heart rate   Monitor urine output and notify Licensed Independent Practitioner for values outside of normal range   Assess for signs of decreased cardiac output   Administer fluid and/or volume expanders as ordered     Problem: Nutrition Deficit:  Goal: Optimize nutritional status  Outcome: Progressing     Problem: Respiratory - Adult  Goal: Achieves optimal ventilation and
nutritional needs: Assess nutritional status and recommend course of action     Problem: Respiratory - Adult  Goal: Achieves optimal ventilation and oxygenation  Outcome: Progressing  Flowsheets (Taken 2023 183)  Achieves optimal ventilation and oxygenation:   Assess for changes in respiratory status   Assess for changes in mentation and behavior   Position to facilitate oxygenation and minimize respiratory effort   Oxygen supplementation based on oxygen saturation or arterial blood gases   Assess and instruct to report shortness of breath or any respiratory difficulty   Respiratory therapy support as indicated  Note: Patient room air prior to procedure. Patient transferred from PACU to PCU room 4311 by Eliana , PACU RN. Report received by Eliana , RN and that patient was having shortness of breath and sinus tachy 120-130s. Patient was placed on BiPAP in PACU and then later placed on 5L nasal cannula. PCU-Patient on 5 L nasal cannula SpO2 92-95%. Continuous O2 monitoring. Heart rate 105-113s sinus tachy.  Patient states \"breathing better\"      Problem: Gastrointestinal - Adult  Goal: Minimal or absence of nausea and vomiting  Flowsheets (Taken 2023 1830)  Minimal or absence of nausea and vomiting:   Administer IV fluids as ordered to ensure adequate hydration   Maintain NPO status until nausea and vomiting are resolved   Administer ordered antiemetic medications as needed   Provide nonpharmacologic comfort measures as appropriate   Advance diet as tolerated, if ordered  Note: Patient denies any nausea or vomiting

## 2023-06-22 NOTE — PROCEDURES
Colin Mcfarland is a 72 y.o. male patient. 1. Pancreatic pseudocyst    2. Hepatic cirrhosis, unspecified hepatic cirrhosis type, unspecified whether ascites present ([facility]    3. Sepsis, due to unspecified organism, unspecified whether acute organ dysfunction present [facility])      Past Medical History:   Diagnosis Date    COPD (chronic obstructive pulmonary disease) ([facility]     Full dentures     History of basal cell carcinoma     History of blood transfusion     History of gout 11/11/2020    Hx of blood clots     Arterial blockage in RLE    Hyperlipidemia     Hypertension     Other cirrhosis of liver ([facility] 6/16/2023    Primary osteoarthritis of right knee      Blood pressure (!) 79/54, pulse (!) 103, temperature (!) 96 °F (35.6 °C), temperature source Rectal, resp. rate (!) 37, height 5' 10\" (1.778 m), weight 194 lb 14.2 oz (88.4 kg), SpO2 98 %. Intubation    Date/Time: 6/22/2023 12:10 AM  Performed by: Calvin Gama DO  Authorized by: Radha Wallace MD   Consent: The procedure was performed in an emergent situation. Relevant documents: relevant documents present and verified  Test results: test results available and properly labeled  Site marked: the operative site was marked  Imaging studies: imaging studies available  Required items: required blood products, implants, devices, and special equipment available  Patient identity confirmed: arm band  Time out: Immediately prior to procedure a \"time out\" was called to verify the correct patient, procedure, equipment, support staff and site/side marked as required.   Indications: hypercapnia  Intubation method: video-assisted  Patient status: sedated  Preoxygenation: nonrebreather mask  Pretreatment medications: none  Sedatives: propofol  Paralytic: none  Tube size: 8.0 mm  Tube type: cuffed  Number of attempts: 1  Cricoid pressure: no  Cords visualized: yes  Post-procedure assessment: chest rise and ETCO2 monitor  Breath sounds: equal  Cuff inflated: yes  ETT

## 2023-06-22 NOTE — CONSULTS
Nephrology Consult Note                                                                                                                                                                                                                                                                                                                                                               Office : [phone]     Fax :[phone]              Patient's Name: Wilner Santos  9:15 AM  6/22/2023      Assessment/Plan   ZOHAIB     2. Shock     3. Anemia    4. Acid- base/ Electrolyte imbalance     5. Lactic acidosis     6. Hyperkalemia     7. Cirrhosis     Plan   - pt on CRRT   - on pressors   - prognosis poor   - family discussion   - abx   - cx       Recommend to dose adjust all medications  based on renal functions  Maintain SBP> 90 mmHg   Daily weights   AVOID NSAIDs  Avoid Nephrotoxins  Monitor Intake/Output  Call if significant decrease in urine output         Reason for Consult: ZOHAIB   Requesting Physician:  Myriam Romero DO      Chief Complaint:  N/V     History of Present Ilness:    Patient is a 70-year-old male with past medical history of COPD, basal cell carcinoma, gout, previous blood clots s/p IVC filter, hyperlipidemia, hypertension, liver cirrhosis who presented to [facility] ED on 6/16 with abnormal CT scan on 6/16 which showed cirrhotic liver with large amount of abdominal and pelvic ascites, multiple peripancreatic fluid collections possibly representing infected pancreatic pseudocysts and IVC filter with strut seen protruding through the wall the IVC. Vascular surgery was consulted and recommended that IVC strut penetration not the etiology of the patient's multiloculated. General surgery and GI was also consulted. Patient was started on cefepime and Flagyl. Patient underwent EGD on 6/19 which showed a few small cratered ulcers in the duodenal bulb and one small 5 mm ulcer in the bulb that was quite deep.

## 2023-06-22 NOTE — SIGNIFICANT EVENT
initiated at 80% FiO2, 500cc tidal volume, rate of 28 and PEEP of 5. Mata-syneprine was added due to hypotension post-intubation. The patient was started on propofol and fentanyl drips with a RASS goal of 0 to -1 to preserve his respiratory drive. CXR showed appropriate positioning of the ETT and CVC, as well as likely aspiration in the RLL. Repeat ABG was scheduled for an hour after intubation however before this could be drawn, the patient was noted on telemetry to have seizure-like activity. An ativan push was made available however his seizure spontaneously terminated after about a minute before requiring it. Telemetry was showing frequent runs of Vtach. The patient never lost his pulse. Pressor requirements increased substantially. Stat repeat ABG found worsening of his acidosis to pH 6.844 with LA >20 on EPOCH. Stat labs revealed worsening of LA 21.8 -> 24.6; K 4.9 -> 5.2; ;  -> 272,  -> 1,264, tBili 2.5 -> 2.9, lipase 44 -> 84, INR 1.36 -> 5.83; CK 1,005. Delta-delta revealing pure metabolic acidosis with appropriate compensation. Another amp of bicarb was given; the rate of his bicarb drip was increased to 125cc/h; a 150mg bolus of amiodarone was given with resolution of runs of Vtach. After discussion with surgery residents the decision was made to place a vas cath for CRRT due to refractory lactic acidosis and hyperkalemia. Nephrology consult placed urgently, discussed with Dr. Mayela Pires who placed CRRT orders. CXR confirmed proper positioning of the vas cath. Assessment:    Patient had earlier in the day (23) noted an acute increase in BLE pain and was found to have purple, mottled BLE that were cool to touch and without pedal, PT and popliteal pulses on doppler, so vascular surgery was consulted and ordered a CTA of the abdominal aorta with bilateral runoff which found the followin.  Severe bilateral lower extremity outflow disease, with apparent occlusion of  the

## 2023-06-22 NOTE — CONSULTS
ICU Progress Note  PGY-1    Admit Date: 6/16/2023  Day: 6  Vent Day: 1  IV Access:Peripheral  IV Fluids:None  Vasopressors:None                Antibiotics: Merrem  Diet: No diet orders on file    CC: Abnormal outpatient CT abdomen    Interval history:   Patient overnight was severely hypotensive and bedside blood gas found him to be in severe. Metabolic acidosis suspected secondary to bowel ischemia and/or perforation given his multiple pancreatic pseudocysts, extraluminal air and gastric and duodenal ulcers seen on EGD 6/19. He was transferred to the ICU at 2230 for pressor management, central line and arterial line placement. At 2337, endotracheal intubation. 1 hour after ET intubation he had a 1 minute seizure that self-resolved. At 0100 he flipped into Vtach with pulse. Pads placed , then 1 amp bicarb pushed and 150 amiodarone which flipped pt into Afib rhythm. See Transfer note below by Dr. Bradley Carrillo for full interval history. Significant Event / Transfer Note:  Patient is a 53-year-old male with past medical history of COPD, basal cell carcinoma, gout, previous blood clots s/p IVC filter, hyperlipidemia, hypertension, liver cirrhosis who presented to [place] [id number] ED on 6/16 with abnormal CT scan on 6/16 which showed cirrhotic liver with large amount of abdominal and pelvic ascites, multiple peripancreatic fluid collections possibly representing infected pancreatic pseudocysts and IVC filter with strut seen protruding through the wall the IVC. Vascular surgery was consulted and recommended that IVC strut penetration not the etiology of the patient's multiloculated. General surgery and GI was also consulted. Patient was started on cefepime and Flagyl. Patient underwent EGD on 6/19 which showed a few small cratered ulcers in the duodenal bulb and one small 5 mm ulcer in the bulb that was quite deep. Patient underwent further studies with Gastrografin upper GI series on 6/20 which showed no evidence of perforation.

## 2023-06-22 NOTE — PROCEDURES
Lynn Tang is a 72 y.o. male patient. 1. Pancreatic pseudocyst    2. Hepatic cirrhosis, unspecified hepatic cirrhosis type, unspecified whether ascites present ([facility]    3. Sepsis, due to unspecified organism, unspecified whether acute organ dysfunction present ([facility]    4. Sepsis with acute hypoxic respiratory failure and septic shock, due to unspecified organism [facility])      Past Medical History:   Diagnosis Date    COPD (chronic obstructive pulmonary disease) ([facility]     Full dentures     History of basal cell carcinoma     History of blood transfusion     History of gout 11/11/2020    Hx of blood clots     Arterial blockage in RLE    Hyperlipidemia     Hypertension     Other cirrhosis of liver ([facility] 6/16/2023    Primary osteoarthritis of right knee      Blood pressure (!) 79/54, pulse (!) 103, temperature (!) 96 °F (35.6 °C), temperature source Rectal, resp. rate (!) 37, height 5' 10\" (1.778 m), weight 194 lb 14.2 oz (88.4 kg), SpO2 98 %. HD vascular catheter    Date/Time: 6/22/2023 2:22 AM  Performed by: Meredith Camacho MD  Authorized by: Silvestre Bethea MD   Consent: The procedure was performed in an emergent situation. Verbal consent not obtained. Written consent not obtained. Patient identity confirmed: arm band  Indications: vascular access  Skin prep agent dried: skin prep agent completely dried prior to procedure  Sterile barriers: all five maximum sterile barriers used - cap, mask, sterile gown, sterile gloves, and large sterile sheet  Hand hygiene: hand hygiene performed prior to central venous catheter insertion  Location details: right internal jugular  Patient position: flat  Catheter type: triple lumen  Catheter size: 13fr.   Pre-procedure: landmarks identified  Ultrasound guidance: yes  Sterile ultrasound techniques: sterile gel and sterile probe covers were used  Number of attempts: 1  Successful placement: yes  Post-procedure: line sutured and dressing applied  Assessment: blood

## 2023-06-22 NOTE — CONSULTS
[facility]  Palliative Medicine Consultation Note      Date Of Admission:6/16/2023  Date of consult: 06/22/23  Seen by [facility] in the past:  No    Recommendations:        Saw pt at the bedside, no visitors present. 845: Called pt's daughter Angus Kunz, no answer, left VM with callback number. Found phone number for pt's sister Adriana Redd, called her as well however no answer, left VM with callback number. I reviewed prior facesheets for alternative contact information and was not able to find any. Will continue to attempt to contact family. 852: Received a call back from pt's sister Adriana Redd, who was aware pt was in the hospital and visited yesterday. Explained pt is very sick and had to be moved to the ICU. She is going to contact pt's daughter Angus Kunz and come to the hospital ASAP this morning. D/w ICU team. ]    945: Received call back from Angus Kunz and updated her as to pt's condition. She is coming to the hospital shortly. 950: Met with pt's sister Adriana Redd at the bedside along with the ICU team, who explained pt's condition. Adriana Redd is understandably tearful. She reports pt has two daughters, Jeffry Garcia and Angus Kunz who are local and will be coming to the hospital today. 1030: Met with the pt's daughters Angus Kunz and Jeffry Garcia, along with pt's sister Adriana Redd and brothers at bedside with the ICU. Dr. Maci Villegas provided an in depth explanation of pt's condition and concerns regarding pt's poor prognosis. Pt's family is understandably tearful and upset regarding the sudden change in pt's condition. We discussed code status in depth, offered option of considering [facility] as pt would be unlikely to recovery from a cardiac arrest. Pt's daughters, who are his NOK, want to keep him a full code at this time. We did express that the pt is likely to have a cardiac arrest today, and provided emotional support. The family requested  support, d/w Nomi Partida.      1. Goals of Care/Advanced Care planning/Code

## 2023-06-22 NOTE — DEATH NOTES
Death Pronouncement Note  Patient's Name: Wilner Santos   Patient's YOB: 1950  MRN Number: [id number]    Admitting Provider: Reynaldo More DO  Attending Provider: Joe Scott MD    Patient was examined and the following were absent: Pulses, Blood Pressure, and Respiratory effort    I declared the patient dead on 6/22/2023 at 7:28 PM    Preliminary Cause of Death: Death due to septic shock [facility])     Electronically signed by Marisol Foley MD on 6/22/23 at 7:32 PM EDT

## 2023-06-23 LAB
BACTERIA BLD CULT ORG #2: NORMAL
BACTERIA BLD CULT: NORMAL
EKG ATRIAL RATE: 104 BPM
EKG DIAGNOSIS: NORMAL
EKG Q-T INTERVAL: 404 MS
EKG QRS DURATION: 152 MS
EKG QTC CALCULATION (BAZETT): 580 MS
EKG R AXIS: 17 DEGREES
EKG T AXIS: 186 DEGREES
EKG VENTRICULAR RATE: 124 BPM

## 2023-06-23 NOTE — DISCHARGE SUMMARY
significant intra-abdominal abscess burden which may be amenable to IR guided versus EUS guided drainage. The following issues were addressed during hospitalization:     Vascular surgery was consulted and recommended that IVC strut penetration not the etiology of the patient's multiloculated. General surgery and GI was also consulted. Patient was started on cefepime and Flagyl. Patient underwent EGD on 6/19 which showed a few small cratered ulcers in the duodenal bulb and one small 5 mm ulcer in the bulb that was quite deep. Patient underwent further studies with Gastrografin upper GI series on 6/20 which showed no evidence of perforation. Patient underwent US-guided paracentesis on 6/21 by IR with removal of 2.2 L of fluid. On 6/21 around noon patient reportedly noted to have cool dusky toes was complaining of bilateral lower extremity pain and team unable to find pulses by doppler. Patient was also becoming hypotensive to about  BP 86/59 which improved to about 103/69 once LR bolus started along with improvement in BLE duskiness. 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 No acute Vascular Surgery intervention was recommended at this time. Patient also received albumin 25g x2. Around 8:30PM on 6/21, medicine team was called to bedside for hypotension with BP of 86/54, , RR 37 and rectal temp was 96. Upon arrival, medicine team was unable to get proper O2 saturation or BP. Patient was also a hard stick so POCT labs were drawn with ABG pH of 6.858, CO2 13.7, O2 184.7 (on 6L O2), LA 13.88, blood sugars less than 20, and K of 6.5. Patient was immediately transferred to ICU for further management. He was given D10 drip along with a bicarb drip and a bicarb push, and antibiotics were switched to [address] [place] [address].  EKG showed LBBB that does not appear to be new

## 2023-06-23 NOTE — PROGRESS NOTES
06/22/23 1247   Encounter Summary   Encounter Overview/Reason  Initial Encounter   Service Provided For: Patient and family together   Referral/Consult From: [address] Family members   Last Encounter  06/22/23  ([email or web address])   Complexity of Encounter High   Begin Time 1210   End Time  1248   Total Time Calculated 38 min   Encounter    Type Initial Screen/Assessment   Assessment/Intervention/Outcome   Assessment Despair   Intervention Active listening;Explored/Affirmed feelings, thoughts, concerns; Discussed relationship with God;Prayer (assurance of)/[facility];Sustaining Presence/Ministry of presence   Outcome Comfort;Expressed feelings, needs, and concerns;Expressed Gratitude;Venting emotion     PT (found intubated) is a [place] who was \"Baptized at the [facility]". PT was surrounded by his two daughters, a son inlaw and two sisters and a brother. Both daughters were weeping but thankful for the visit. Prayer was offered and provided. A prayer shawl was also given. Both seem to provided some comfort. Spiritual Care will continue to follow as need.   Staff Aly Corbett MA, [facility]
06/22/23 1554   Encounter Summary   Encounter Overview/Reason  Initial Encounter   Service Provided For: Patient and family together   Last Encounter  06/22/23  ()   Complexity of Encounter Low   Begin Time 1545   End Time  1557   Total Time Calculated 12 min   Encounter    Type Follow up   Crisis   Type Follow up   Spiritual/Emotional needs   Type Spiritual Support;Spiritual Distress; Emotional Distress; Difficult news received   Grief, Loss, and Adjustments   Type End of Life;Grief and loss   Ethics/Mediation   Type Family Meeting   Behavioral Health    Type  Initial Encounter   Assessment/Intervention/Outcome   Assessment Calm;Fearful;Impaired resilience; Interrupted family processes; Peaceful;Unable to assess   Intervention Active listening;Confronted/Challenged; Empowerment;Nurtured Hope   Outcome Comfort;Coping;Peace; Refused/Declined        entered closed room with approx 8 family members and patient. Patient was not responsive and intubated, so  went around to family members.  was speaking with brother of Patient, and was asked by the daughter's boyfriend to leave the room. Family member expressed appreciation for the visit, but asked for the  to leave. Nurse was helpful for providing  clarity on what was going on with the room. Patients' status is poor, and family is very present.
2230- pt arrived from [address] [place] [address] Road. They were unable to get a BP or a good oxygen saturation. He was experiencing tachypnea, and only A&Ox1 to self. Initial BP was 80s/50s. An amp of Bicarb was pushed and a bicarb drip was started. A LIJ and R radial art line were placed. 2337-50 mcg propofol and 30 etomidate pushed, pt intubated. Cxr confirmed placement of ETT and OG    [id number]- pt seized    0100- pt flipped into [facility]. Pads placed and prepped for a code. Pushed an amp of bicarb and 150 amio when pt went into afib. Pt did not lose a pulse. Surgery placed a R vascath to begin CRRT. \"Access extremely negative\" warning continued to ring out so blood was returned. With a new set, access was extremely negative running at 200 ml/hr. The machine only tolerates 150 mL/hr blood flow rate. Only skin issues of note are mottled legs.
4 Eyes Skin Assessment     NAME:  David Mckeon OF BIRTH:  1950  MEDICAL RECORD NUMBER:  [id number]    The patient is being assessed for  Transfer to New Unit    I agree that at least one RN has performed a thorough Head to Toe Skin Assessment on the patient. ALL assessment sites listed below have been assessed. Areas assessed by both nurses:    Head, Face, Ears, Shoulders, Back, Chest, Arms, Elbows, Hands, Sacrum. Buttock, Coccyx, Ischium, Legs. Feet and Heels, and Under Medical Devices         Does the Patient have a Wound?  No noted wound(s)       Willy Prevention initiated by RN: Yes  Wound Care Orders initiated by RN: No    Pressure Injury (Stage 3,4, Unstageable, DTI, NWPT, and Complex wounds) if present, place Wound referral order by RN under : No    New Ostomies, if present place, Ostomy referral order under : No     Nurse 1 eSignature: Electronically signed by Paola Finley RN on 6/22/23 at 7:40 AM EDT    **SHARE this note so that the co-signing nurse can place an eSignature**    Nurse 2 eSignature: {Esignature:[id number]}
Called and talked to medicine Dr Nubia Ornelas regarding about CTA result. Per MD, no medications required right now, pt had received boluses and albumin earlier. Will continue check doppler to monitor for returning of pedal pulses .
Comprehensive Nutrition Assessment    RECOMMENDATIONS:  PO Diet: NPO  ONS: NPO- start ONS once diet adv. Nutrition Education: No recommendation at this time   If unable to advance diet by day 5, rec'd initiating Nutrition Support     NUTRITION ASSESSMENT:   Nutritional summary & status: Follow up. Pt has been NPO x4 days. Discussed w/ MD who advanced pt to regular diet today. Noted from nursing this afternoon, \"BLE purple and blue and cold to touch. Pt not able to wiggle his toes which isn't new; however, his plantar and dorsiflexion of his BLE is weaker than previous\". Pt was sent to CT scan & made NPO again. Pt c/o abd pain. RD to continue following; rec'd initiating NS if unable to advance diet by day 5. Admission/PMH: Sepsis/COPD, Gout, HLD, HTN    MALNUTRITION ASSESSMENT  Context of Malnutrition: Chronic Illness   Malnutrition Status: Insufficient data  Findings of the 6 clinical characteristics of malnutrition (Minimum of 2 out of 6 clinical characteristics is required to make the diagnosis of moderate or severe Protein Calorie Malnutrition based on AND/ASPEN Guidelines):  Energy Intake:  Unable to assess  Weight Loss:  10% over 6 months     Body Fat Loss:  Unable to assess     Muscle Mass Loss:  Unable to assess    Fluid Accumulation:  No significant fluid accumulation     Strength:  Not Performed    NUTRITION DIAGNOSIS   Inadequate oral intake related to inadequate protein-energy intake as evidenced by NPO or clear liquid status due to medical condition    Nutrition Monitoring and Evaluation:   Food/Nutrient Intake Outcomes:  Diet Advancement/Tolerance  Physical Signs/Symptoms Outcomes:  Biochemical Data, Nutrition Focused Physical Findings, Skin, Weight     OBJECTIVE DATA: Significant to nutrition assessment  Nutrition Related Findings: +BM 6/20. No edema.  Na 132, Glucose 117  Wounds: None  Nutrition Goals: Initiate PO diet, by next RD assessment     CURRENT NUTRITION THERAPIES  Diet NPO  PO
Dr. Anila Rojas made aware that IV albumin has completed per his request.    Also, pt has not been urinating for this shift; however, pt has been NPO for the majority of this shift. Bladder scan revealed 108 mL in bladder. Dr. Anila Rojas made aware of this as well. 1554 - Pt's BLE pain is not improving, no new changes in the perfusion status of his BLE, Dr. Anila Rojas made aware. 46 - Dr. Anila Rojas made aware of pt's 2nd round of IV Albumin infusion completion. 1825 - Lovenox SQ unheld and administered in pt's abdomen.
Family at bedside. Patient currently on 4 pressors at maximum doses.  Family opted to change his code status to [facility] after multiple talks with palliative care and ICU team.    Palma Diaz MD  Internal Medicine resident, PGY-2
Family expressed there wishes to make pt comfort care. Icu team notified. Orders placed. Comfort meds given. CRRT stopped and pressors turned off. Pt terminally extubated by RT.
General Surgery   Daily Progress Note  Patient: Edel Parnell      CC: duodenal fluid collection    SUBJECTIVE:   Respiratory distress yesterday after EGD requiring BiPAP. Improved overnight. Denies shortness of breathe this AM. Denies pain in abdomin this AM. Passing gas denies bowel movement. ROS:   A 14 point review of systems was conducted, significant findings as noted above. All other systems negative. OBJECTIVE:    PHYSICAL EXAM:    Vitals:    06/19/23 1842 06/19/23 2015 06/20/23 0010 06/20/23 0345   BP:  122/84 117/80 120/79   Pulse:  (!) 101 (!) 105 (!) 110   Resp:  16 16 16   Temp:  97.5 °F (36.4 °C) 97.7 °F (36.5 °C) 98 °F (36.7 °C)   TempSrc:  Oral Oral Oral   SpO2: 94% 95% 96% (!) 85%   Weight:       Height:         General appearance: alert, no acute distress, grooming appropriate, resting comfortably  Eyes: PERRL, no scleral icterus  Neck: trachea midline, no JVD  Chest/Lungs: symmetrical chest rise, normal effort  Cardiovascular: RRR  Abdomen: soft, non-tender, distended, positive fluid wave, no guarding/rigidity, nonperitoneal  Skin: warm and dry, no rashes  Extremities: no edema, no cyanosis  Neuro: A&Ox3, no focal deficits, sensation intact    LABS:   Recent Labs     06/19/23  0420 06/20/23  0441   WBC 6.1 8.0   HGB 12.8* 13.6   HCT 37.1* 40.6   MCV 95.1 97.7    246          Recent Labs     06/19/23  0420 06/20/23  0441   * 133*   K 4.0 4.6    100   CO2 27 20*   BUN 12 15   CREATININE 0.6* 0.7*          No results for input(s): AST, ALT, ALB, BILIDIR, BILITOT, ALKPHOS in the last 72 hours. No results for input(s): LIPASE, AMYLASE in the last 72 hours. ASSESSMENT & PLAN:   This is a 72 y.o. male with extensive past medical history as delineated above who presents with CT scan findings persistent with possible gastric perforation and extensive pancreatic pseudocysts with possible infection.    - Patient with peripancreatic fluid collection with air.  Suspect chronic
General Surgery   Daily Progress Note  Patient: Marija Kidd      CC: Duodenal fluid collection    SUBJECTIVE:   Patient with IR paracentesis yesterday. Decompensated overnight with transfer to ICU. Intubated and on pressors this AM. CRRT for refractory acidosis. ROS:   A 14 point review of systems was conducted, significant findings as noted above. All other systems negative. OBJECTIVE:    PHYSICAL EXAM:    Vitals:    06/21/23 2108 06/21/23 2200 06/21/23 2353 06/22/23 0600   BP: (!) 79/54      Pulse:       Resp:       Temp:       TempSrc:       SpO2:  (!) 87% 98%    Weight:    215 lb 9.8 oz (97.8 kg)   Height:         General appearance: intubated and sedated  Eyes: no scleral icterus  Neck: trachea midline, no JVD  Chest/Lungs: symmetrical chest rise, intubated, mechanical breathe sounds  Cardiovascular: tachycardic  Abdomen: soft, mild improvement of distension, positive fluid wave  Skin: warm and dry, no rashes  Extremities: no edema, no cyanosis  Neuro: sedated,    LABS:   Recent Labs     06/21/23 2153 06/22/23  0120   WBC 28.9* 31.2*   HGB 11.5* 10.2*   HCT 37.5* 33.0*   .5* 106.0*    179          Recent Labs     06/21/23 2153 06/22/23  0120   * 130*  133*   K 6.8* 4.9  5.2*   CL 91* 88*  91*   CO2 5* 3*  6*   PHOS  --  9.4*   BUN 23* 23*  22*   CREATININE 2.1* 2.2*  2.2*          Recent Labs     06/21/23 2153 06/22/23  0120   * 1,264*  932*   ALT 79* 272*  205*   BILIDIR 1.2* 1.2*   BILITOT 2.7* 2.9*  2.5*   ALKPHOS 137* 137*  115          Recent Labs     06/22/23  0120   LIPASE 84.0*       ASSESSMENT & PLAN:   This is a 72 y.o. male with extensive past medical history as delineated above who presents with CT scan findings persistent with possible gastric perforation and extensive pancreatic pseudocysts with possible infection.    - Patient with peripancreatic fluid collection with air. Suspect chronic process and not acute.  Improved on CTA yesterday.  - UGI
General Surgery   Daily Progress Note  Patient: Ruperto Thompson      CC: Duodenal fluid collection    SUBJECTIVE:   No acute events overnight. Continues to deny abdominal pain. Had multiple large bowel movements yesterday. Denies N/V.     ROS:   A 14 point review of systems was conducted, significant findings as noted above. All other systems negative. OBJECTIVE:    PHYSICAL EXAM:    Vitals:    06/20/23 2005 06/20/23 2350 06/21/23 0425 06/21/23 0640   BP: 99/68 119/75 119/81 105/76   Pulse: 94 85 92 (!) 115   Resp: 16 18 16 16   Temp: 97.5 °F (36.4 °C) 97.4 °F (36.3 °C) 97.8 °F (36.6 °C) 97.6 °F (36.4 °C)   TempSrc: Oral Oral Oral Oral   SpO2: 92% 93% 94% 95%   Weight:   194 lb 14.2 oz (88.4 kg)    Height:         General appearance: alert, no acute distress, grooming appropriate, resting comfortably  Eyes: PERRL, no scleral icterus  Neck: trachea midline, no JVD  Chest/Lungs: symmetrical chest rise, normal effort  Cardiovascular: RRR  Abdomen: soft, non-tender, distended, positive fluid wave, no guarding/rigidity, nonperitoneal  Skin: warm and dry, no rashes  Extremities: no edema, no cyanosis  Neuro: A&Ox3, no focal deficits, sensation intact    LABS:   Recent Labs     06/20/23  0441 06/21/23  0450   WBC 8.0 10.2   HGB 13.6 14.6   HCT 40.6 44.3   MCV 97.7 96.9    271          Recent Labs     06/20/23  0441 06/21/23  0450   * 132*   K 4.6 4.3    99   CO2 20* 21   BUN 15 18   CREATININE 0.7* 0.7*          No results for input(s): AST, ALT, ALB, BILIDIR, BILITOT, ALKPHOS in the last 72 hours. No results for input(s): LIPASE, AMYLASE in the last 72 hours. ASSESSMENT & PLAN:   This is a 72 y.o. male with extensive past medical history as delineated above who presents with CT scan findings persistent with possible gastric perforation and extensive pancreatic pseudocysts with possible infection.    - Patient with peripancreatic fluid collection with air. Suspect chronic process and not acute.
Icu resident and ICU charge were at bedside, evaluating pt, stat labs, ABG, CRX were done. Pt was transferred to ICU 4505. Report given to Carmelita Hodges.
Internal Medicine Progress Note    Admit Date: 2023  Hospital Day: 4   Patient name: Homero Peterson   : 1950     CC:   Abnormal CT (Had CT this morning, Dr called and wants pt to be admitted for paracentesis )     Interval history:   No acute events overnight. Pt reports resting well and denies any fever, chills, abdominal pain, nausea, vomiting. Last BM shortly prior to arrival. Questions re procedures answered. Medications   Scheduled Meds:   pantoprazole  40 mg IntraVENous BID    lidocaine-EPINEPHrine  20 mL IntraDERmal Once    [Held by provider] amLODIPine  10 mg Oral Daily    [Held by provider] carvedilol  12.5 mg Oral BID    [Held by provider] lisinopril  40 mg Oral Daily    sodium chloride flush  5-40 mL IntraVENous 2 times per day    [Held by provider] enoxaparin  40 mg SubCUTAneous Daily    metroNIDAZOLE  500 mg IntraVENous Q8H    cefepime  2,000 mg IntraVENous Q12H     Continuous Infusions:   sodium chloride 20 mL/hr at 23 0547     Objective   Vitals  Patient Vitals for the past 8 hrs:   BP Temp Temp src Pulse Resp SpO2   23 0816 116/73 98 °F (36.7 °C) Oral 72 -- 95 %   23 0732 -- -- -- 70 -- --   23 0400 -- -- -- 69 -- --   23 0302 110/71 98 °F (36.7 °C) Oral 71 16 98 %   23 0200 -- -- -- 70 -- --         Intake/Output Summary (Last 24 hours) at 2023 0858  Last data filed at 2023  Gross per 24 hour   Intake 480 ml   Output 250 ml   Net 230 ml         ROS: A 10 point review of systems was conducted and significant findings are noted in the interval history. Physical Exam  Constitutional:       General: He is not in acute distress. Appearance: Normal appearance. He is normal weight. He is not ill-appearing or diaphoretic. HENT:      Head: Normocephalic and atraumatic. Mouth/Throat:      Mouth: Mucous membranes are moist.      Pharynx: Oropharynx is clear.    Cardiovascular:      Rate and Rhythm: Normal rate and regular
Internal Medicine Progress Note    Admit Date: 2023  Hospital Day: 5   Patient name: Renea Smith   : 1950     CC:   Abnormal CT (Had CT this morning, Dr called and wants pt to be admitted for paracentesis )     Interval history:   Pt underwent EGD yesterday which showed gastric ulcers, but no protrusion of IVC filter in duodenum. Plan for UGI for further eval of duodenal ulcer. Will touch base with IR re paracentesis. Medications   Scheduled Meds:   pantoprazole  40 mg IntraVENous BID    lidocaine-EPINEPHrine  20 mL IntraDERmal Once    [Held by provider] amLODIPine  10 mg Oral Daily    [Held by provider] carvedilol  12.5 mg Oral BID    [Held by provider] lisinopril  40 mg Oral Daily    sodium chloride flush  5-40 mL IntraVENous 2 times per day    [Held by provider] enoxaparin  40 mg SubCUTAneous Daily    metroNIDAZOLE  500 mg IntraVENous Q8H    cefepime  2,000 mg IntraVENous Q12H     Continuous Infusions:   sodium chloride 20 mL/hr at 23 1524     Objective   Vitals  Patient Vitals for the past 8 hrs:   BP Temp Temp src Pulse Resp SpO2   23 0735 93/63 97.5 °F (36.4 °C) Oral 75 16 97 %   23 0345 120/79 98 °F (36.7 °C) Oral (!) 110 16 (!) 85 %         Intake/Output Summary (Last 24 hours) at 2023 0831  Last data filed at 2023 5714  Gross per 24 hour   Intake 1000 ml   Output 675 ml   Net 325 ml         ROS: A 10 point review of systems was conducted and significant findings are noted in the interval history. Physical Exam  Constitutional:       General: He is not in acute distress. Appearance: Normal appearance. He is normal weight. He is not ill-appearing or diaphoretic. HENT:      Head: Normocephalic and atraumatic. Mouth/Throat:      Mouth: Mucous membranes are moist.      Pharynx: Oropharynx is clear. Cardiovascular:      Rate and Rhythm: Normal rate and regular rhythm. Pulses: Normal pulses. Heart sounds: Normal heart sounds.  No murmur
Internal Medicine Progress Note    Admit Date: 2023  Hospital Day: 6   Patient name: Lawrence Cuevas   : 1950     CC:   Abnormal CT (Had CT this morning,  called and wants pt to be admitted for paracentesis )     Interval history:   NAEO. UGI study unremarkable. Undergoing paracentesis this AM.     Addendum @ 8563:   S/p paracentesis with 2.2L removed. Paged by nurse that on return to floor pt c/o BLE pain. She noted toes were dusky, cold. BLE continued to worsen extending to proximal extremities. Team to bedside with doppler; unable to find DP/PT/popliteal pulses. Pt at that time c/o minimal pain, but numbness to distal BLE. Vascular surgery consulted and evaluated pt at bedside. Plan to obtain stat CTA. Medications   Scheduled Meds:   pantoprazole  40 mg IntraVENous BID    lidocaine-EPINEPHrine  20 mL IntraDERmal Once    [Held by provider] amLODIPine  10 mg Oral Daily    [Held by provider] carvedilol  12.5 mg Oral BID    [Held by provider] lisinopril  40 mg Oral Daily    sodium chloride flush  5-40 mL IntraVENous 2 times per day    [Held by provider] enoxaparin  40 mg SubCUTAneous Daily    metroNIDAZOLE  500 mg IntraVENous Q8H    cefepime  2,000 mg IntraVENous Q12H     Continuous Infusions:   sodium chloride 25 mL (23 6328)     Objective   Vitals  Patient Vitals for the past 8 hrs:   BP Temp Temp src Pulse Resp SpO2 Weight   23 0705 99/76 97.5 °F (36.4 °C) Oral (!) 124 16 97 % --   23 0640 105/76 97.6 °F (36.4 °C) Oral (!) 115 16 95 % --   23 0425 119/81 97.8 °F (36.6 °C) Oral 92 16 94 % 194 lb 14.2 oz (88.4 kg)         Intake/Output Summary (Last 24 hours) at 2023  Last data filed at 2023 0615  Gross per 24 hour   Intake 250 ml   Output --   Net 250 ml         ROS: A 10 point review of systems was conducted and significant findings are noted in the interval history. Physical Exam  Constitutional:       General: He is not in acute distress.
Multiple attempts made to contact family, Uzma Becerra via [phone]. Voice messages left. Will continue to attempt to update family on patient's clinical status and plan of care.      Chavo Decker MD   Internal Medicine Resident, PGY-2
No cardiac activity noted on monitor. ICU team notified.
Nurse informed me that family wants to make patient comfort care. Family members at bedside with patient with daughters present who specifically requested that they would want to make patient comfortable and stop all pressors and CRRT and any other treatment. I explained that patient is currently on 4 pressors with a MAP of 30. We are net neutral on CRRT. They voiced that they understood due to continuous discussions that were done throughout the day. They reiterated that they simply want to make him comfortable given that he has a poor prognosis. We discussed terminally extubating patient. Daughter reports that she felt her \"dad is no longer here\" and simply wanted to make him comfortable with no tubes or pressors.        Leonora Loyola MD  Internal Medicine Resident, PGY-2
PACU Transfer Note    Vitals:    06/19/23 1815   BP: 91/72   Pulse: 117   Resp: 16   Temp: 97.5   SpO2: 94%       No intake/output data recorded. Pain assessment:  none  Pain Level: 0    Report given to Receiving unit RN, Kala Alvarez by phone and at bedside in handoff to floor. Transferred back to room 4311 per stretcher per pacu transport, on monitor with RN accomanying. Call placed to patient's daughter, Frank Holt for update.     6/19/2023 7:16 PM
Patient received from ENDO to PACU #9 post EGD BIOPSY of Dr. Anibal Rojas. Placed on PACU monitoring equipment. Report given per CRNA. Per report, patient was slow to awaken with small TVs post procedure. On arrival, patient is SOB, tachycardic and tachypneic. Spo2 74% on nasal o2. PIV found out leaking in  bed. Placed on NRM to get spo2 >92%. CRNA and Dr. Billie Goodson remain at bedside to restart PIV using US Guidance.
Patient started on CCRT emergently at 343 due to being intubated and unable to contact family, Yecenia Jazmin having issues and surgery called to maniuplate line, CRRT restarted at 5312
Patient transferred from PACU to PCU room 4311 by Gloria Cook, PACU RN. Report received by Gloria Cook RN and that patient was having shortness of breath and sinus tachy 120-130s. Patient was placed on BiPAP in PACU and then later placed on 5L nasal cannula. PCU- patient on 5 L nasal cannula SpO2 92-95%. Continuous O2 monitoring. Heart rate 105-113s sinus tachy. Daughter Delmus Sizer informed via phone by Gloria Cook RN for update.
Patient was seen by Lazaro Garcia who had been concerned about the patient after shift change. I was asked to come bedside to assess the patient. After taking over we could not get any proper oxygenation saturations or blood pressures. I put in stat labs to assess and a stat chest x-ray which was unremarkable. Patient was a difficult stick and was more lethargic today than he was yesterday when we saw him. I had called ICU charge nurse to bring the [facility] so we can get stat ABG on the patient. Respiratory tech came and aided in getting an ABG which showed pH of 6.8, CO2 13, O2 140 on 6L o2, a lactic acid of greater than 13 and blood sugars less than 20. After calculation of delta/delta this is a pure metabolic acidosis  suspected from mesenteric ischemia. Patient was immediately transferred to the ICU using the crash bed available. A central line was placed as soon as the patient arrived to the ICU placed by me and got consent from the patient who was awake and alert. Patient was given a D10 drip along with a bicarb drip and a bicarb push after calculating the sodium bicarb deficit. ICU team made aware who are currently putting in an A-line for the patient to have accurate blood pressure readings. Chest x-ray showed successful central line placed with no acute processes occurring. Given the severity of his symptoms will change the antibiotics to Merrem. Patient was placed on wide-open fluids as well until we have a line running to get accurate pressures. Surgery team was bedside made aware of the plan.
[facility] GI  Gastroenterology Progress Note          SUBJECTIVE:    No abdominal pain or GI bleeding    Physical    VITALS:  /78   Pulse 87   Temp 98 °F (36.7 °C) (Oral)   Resp 16   Ht 5' 10\" (1.778 m)   Wt 197 lb 5 oz (89.5 kg)   SpO2 96%   BMI 28.31 kg/m²   TEMPERATURE:  Current - Temp: 98 °F (36.7 °C); Max - Temp  Av.7 °F (36.5 °C)  Min: 97.4 °F (36.3 °C)  Max: 98 °F (36.7 °C)    NAD, age appropriate  Integ: no rash, jaundice, ecchymoses  Abdomen soft, ND, NT, no HSM, Bowel sounds normal.    Data    Data Review:    Recent Labs     23  0441   WBC 7.3 6.1 8.0   HGB 13.1* 12.8* 13.6   HCT 37.7* 37.1* 40.6   MCV 95.4 95.1 97.7    256 246     Recent Labs     23  0420 23  0441   * 134* 133*   K 3.0* 4.0 4.6   CL 94* 100 100   CO2 27 27 20*   BUN 14 12 15   CREATININE 0.7* 0.6* 0.7*     No results for input(s): AST, ALT, ALB, BILIDIR, BILITOT, ALKPHOS in the last 72 hours. No results for input(s): LIPASE, AMYLASE in the last 72 hours. Recent Labs     23  1641   PROTIME 16.7*   INR 1.36*     No results for input(s): PTT in the last 72 hours. ASSESSMENT   Decompensated alcoholic cirrhosis with ascites: paracentesis today   2. Peptic ulcer disease-  EGD 23   Findings:  Normal 2nd portion of the duodenum. A few small cratered ulcers in the duodenal bulb. One small 5 mm ulcer in the bulb was quite deep. A few small ulcers in the gastric antrum with surrounding erythema. Biopsies were obtained evaluate for H. pylori. Normal esophagus. No esophageal or gastric varices     UGI without extravasation of contrast but it did not reach the duodenum    PLAN    1.  PPI twice daily  2. Alcohol cessation  3. Follow-up hepatology after discharge  4.   Advance diet as tolerated       GABRIEL Rai MD  [facility]
Pharmacy Note - Extended Infusion Beta-Lactam Adjustment    Meropenem ordered for treatment of Intra-abd infection. Per [facility] Extended Infusion Beta-Lactam Policy, Meropenem will be changed to 1000 mg IV Loading dose followed by 1000 mg q12hr EI. Estimated Creatinine Clearance: Estimated Creatinine Clearance: 36 mL/min (A) (based on SCr of 2.1 mg/dL (H)). Dialysis Status, ZOHAIB, CKD: ZOHAIB  BMI: Body mass index is 27.96 kg/m². Rationale for Adjustment: Agent is renally eliminated and demonstrates time-dependent effect on bacterial eradication. Extended-infusion dosing strategy aims to enhance microbiologic and clinical efficacy. Pharmacy will continue to monitor renal function, cultures and sensitivities (where available) and adjust dose as necessary. Please call with any questions.     Lauren Bob PharmD  Main Pharmacy: [id number]  6/21/2023 11:18 PM
Physician Progress Note      Estephanie Cook  CSN #:                  [id number]  :                       1950  ADMIT DATE:       2023 2:12 PM  [address] [place] DATE:  RESPONDING  PROVIDER #:        Vicky Fabian MD          QUERY TEXT:    Pt admitted with \"Large volume abdominal and pelvic ascites secondary to   hepatic cirrhosis. Pt noted to have elevated WBC, CQ=756, Lactic acid=3.4. If   possible, please document in the progress notes and discharge summary if you   are evaluating and /or treating any of the following: The medical record reflects the following:  Risk Factors: pancreatic pseudocysts with peripancreatic abscess  Clinical Indicators: WBC=14.4, PK=945, RR=Lactic Acid=3.4; per H/P \"Positive   for fatigue, Positive for abdominal distention,  nausea and vomiting, Positive for weakness\"; CT of abdomen\"-Multiple   peripancreatic fluid collections are identified containing  extraluminal air. The findings may represent infected pancreatic pseudocysts   from pancreatitis. Multiple abscesses related to a  contained perforated gastric ulcer is a differential\"  Treatment: CBC, CMP, CT of abdomen, Blood culture, U/A, GI/General   surgery/Vascular surgery consults, s/p EGD ,  Meds-IV Flagyl, cont.  IVF NS @ 50 ml/hour, V/S and I/O monitoring per unit   protocol  Options provided:  -- Sepsis due to infected pancreatic pseudocysts versus multiple   peripancreatic abscesses, present on admission, now resolved  -- SIRS without sepsis due to infected pancreatic pseudocysts versus multiple   peripancreatic abscesses  -- infected pancreatic pseudocysts versus multiple peripancreatic abscesses   without Sepsis  -- Sepsis was ruled out  -- Other - I will add my own diagnosis  -- Disagree - Not applicable / Not valid  -- Disagree - Clinically unable to determine / Unknown  -- Refer to Clinical Documentation Reviewer    PROVIDER RESPONSE TEXT:    This patient was treated for sepsis due to infected
Pt VSS. Pt denies pain at this time pt. Pt asked if he might be able to have something to drink. Reached out to physicians about pt having something to drink to which they did ok pt to have limited diet until midnight. Pt was provided with ice water and soda. Pt got up to use the restroom and was helped back to bed. Pt denies further needs at this time. Safety measures in place.
Pt VSS. Pt denies pain or discomfort at this time. Pt has been weaned down to 3L O2 per nassal canula. Current O2 sat is 96%. RA trial was run during which pt dropped as low of 89%. Pt is currently sleeping.
Pt admitted to 5307 from PACU via bed. Pt denied pain. Pt a&ox4. Pt on Tele monitor.  /76   Pulse (!) 115   Temp 97.6 °F (36.4 °C) (Oral)   Resp 16   Ht 5' 10\" (1.778 m)   Wt 194 lb 14.2 oz (88.4 kg)   SpO2 95%   BMI 27.96 kg/m²
Pt back from IR. Pt a/o x4, c/o aching to his posterior BLE for which PRN Tylenol was administered per order. SCD reattached to pt's BLE which helpedi mproved his pain. PO discussed with pt, questions/concerns addressed at this time.
Pt intubated by resident. Equal breath sounds heard, capnography of 14, SpO2 98%. Will continue to monitor.
Pt noticed to have seizure activity. Rhythmic twitching in head and BUE. 2 mg ativan ordered and given. Seizure activity stopped.  ICU team aware and bedside discussing goals of care with family (both daughters)
Pt transferred to icu  Reviewed chart, d/w icu team  He is on max pressor support  Awaiting echo results  Vitals:    06/22/23 1634   BP:    Pulse: (!) 109   Resp: (!) 36   Temp:    SpO2:       Physical Exam  General appearance: sedated and intubated  Skin: BLE duskiness  HEENT: Head: Normocephalic, no lesions, without obvious abnormality. Lungs: intubated. Mechanical breath sounds. Heart: regular rate and rhythm, S1, S2 normal, no murmur, click, rub or gallop  Abdomen: soft, non-tender; bowel sounds normal; no masses,  no organomegaly  Extremities: no edema absence of pulses, dusky appearance of legs still  Neurologic: sedated    Co2 is 9 sodium 134 bun 15 creatinine     71 yo admitted with ascites and cirrhosis, transferred to icu yesterday for hypotension with concerns of severe sepsis and septic shock with concern for atn  Continue with management per icu team; awaiting echo results, though I dont think they will change the outcome as prognosis is poor given fulminant hepatic failure. Palliative care is following along.
Pt was terminally extubated before shift change. Pt passed, [id number], COD:septic shock. Pt family stayed for 45mins and left appropriately. They were given instructions to call the nursing supervisor with the chosen  home. Not a 's case and lifecenter signed off. Post mortem care completed. pt sent with security to [facility].
RASS -4. Fentanyl is off, propofol infusing at 10 mcg/kg/min for seizure prevention.
Report called to 5S RN. Pt transported via bed to 5307 with pt belongings.
This RN entered pt's room for afternoon vitals. BP 86/59. Pt states that his pain to his BLE has returned and is worse than previously. Upon removing blankets off pt's BLE, this RN noticed pt's BLE to be purple and blue and cold to touch. Pt not able to wiggle his toes which isn't new; however, his plantar and dorsiflexion of his BLE is weaker than previous. Also, the purple and duskiness has also spread up his BLE. Bilateral pedal, PT, and Popliteal pulses were not dopplerable. Dr. Jameson Pereyra made aware of these acute changes, at bedside. Dr. Diane Jones also at beside, Vasular surgery c/s. STAT CT abdominal aorta ordered. Pt transferred down to CT in ER, tolerated transfer well. PT returned to room, BP increased to 103/69 once LR bolus started. Pt reports that his pain has improved to his BLE, now rates it 4/10.
Transfer From Medical Floor to ICU    Patient is a 79-year-old male with past medical history of COPD, basal cell carcinoma, gout, previous blood clots s/p IVC filter, hyperlipidemia, hypertension, liver cirrhosis who presented to [facility] ED on 6/16 with abnormal CT scan on 6/16 which showed cirrhotic liver with large amount of abdominal and pelvic ascites, multiple peripancreatic fluid collections possibly representing infected pancreatic pseudocysts and IVC filter with strut seen protruding through the wall the IVC. Vascular surgery was consulted and recommended that IVC strut penetration not the etiology of the patient's multiloculated. General surgery and GI was also consulted. Patient was started on cefepime and Flagyl. Patient underwent EGD on 6/19 which showed a few small cratered ulcers in the duodenal bulb and one small 5 mm ulcer in the bulb that was quite deep. Patient underwent further studies with Gastrografin upper GI series on 6/20 which showed no evidence of perforation. Patient underwent US-guided paracentesis on 6/21 by IR with removal of 2.2 L of fluid. On 6/21 around noon patient reportedly noted to have cool dusky toes was complaining of bilateral lower extremity pain and team unable to find pulses by doppler. Patient was also becoming hypotensive to about  BP 86/59 which improved to about 103/69 once LR bolus started along with improvement in BLE duskiness. 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 No acute Vascular Surgery intervention was recommended at this time. Patient also received albumin 25g x2. Around 8:30PM medicine team was called to bedside for hypotension with BP of 86/54, , RR 37 and rectal temp was 96. Upon arrival, medicine team was unable to get proper O2 saturation or BP.  Patient was also a hard stick so POCT labs
Vascular Surgery     Contacted by primary team regarding dusky lower extremities with loss of pulses following return from paracentesis. The patient was evaluated at the bedside revealing duskiness, lack of doppler signals distally with doppler signals of the bilateral femoral arteries. The patient has decreased distal sensation and motor function. Recommended STAT CTA with bilateral lower extremity runoff. Prior to transport to the CT scanner the patient's SBP was noted to be in the low 80s; accordingly, his fluids were ran wide open in transit to the CT scanner. CT revealed no acute occlusion and intact flow to the distal extremities. With institution of volume resuscitation the duskiness improved. Suspect symptoms were secondary to low perfusion rather than acute vascular pathology.     - No acute Vascular Surgery intervention indicated at this time  - Fluid resuscitation/medical management per primary team  - The patient was seen and evaluated. Dr. Sharri Zuniga was present and personally reviewed the CT images; agreed with the assessment and plan.    - The plan was communicated to the primary team.    Ksenia Calle MD, MPH  PGY-4 General Surgery  06/21/23  1:28 PM
Vascular team aware of CTA results. Pt c/o BLE pain and BLE pulses still not dopplerable.
bp was 86/54, repeated was 96/64. RR 37. rectal temp was 96, , can't get SpO2. pt denied chestpain but breathing shallow and fast.   Warm blanket provided. Ear probe using now for SpO2, still pending result.    Medicine made aware, on the way to see pt
1 ml/kcal or per MD    ANTHROPOMETRICS  Current Height: 5' 10\" (177.8 cm)  Current Weight - Scale: 197 lb 5 oz (89.5 kg)    Admission weight: 192 lb (87.1 kg)    The patient will be monitored per nutrition standards of care. Consult dietitian if additional nutrition interventions are needed prior to RD reassessment.      Kita Del Toro, [address]:  [phone]  Office:  [phone]
either from ulcer or possibly tumor. - EGD possibly today per GI  - FU paracentesis, planned today  - Surgical intervention not warranted at this time. Surgery will be available in case of further need for surgical intervention if patient were to deteriorate  - Rest of care per primary    Page Morgan, DO PGY-3  General Surgery  06/19/23  7:53 AM    I have seen, examined, and reviewed the patients chart. I agree with the residents assessment and have made appropriate changes.     Christina Louis

## 2023-06-24 LAB — BACTERIA FLD AEROBE CULT: NORMAL

## 2023-06-26 LAB
BACTERIA BLD CULT ORG #2: NORMAL
BACTERIA BLD CULT: NORMAL

## 2025-03-16 NOTE — PATIENT INSTRUCTIONS
Personalized Preventive Plan for Glenna Hartman - 10/23/2018  Medicare offers a range of preventive health benefits. Some of the tests and screenings are paid in full while other may be subject to a deductible, co-insurance, and/or copay. Some of these benefits include a comprehensive review of your medical history including lifestyle, illnesses that may run in your family, and various assessments and screenings as appropriate. After reviewing your medical record and screening and assessments performed today your provider may have ordered immunizations, labs, imaging, and/or referrals for you. A list of these orders (if applicable) as well as your Preventive Care list are included within your After Visit Summary for your review. Other Preventive Recommendations:    · A preventive eye exam performed by an eye specialist is recommended every 1-2 years to screen for glaucoma; cataracts, macular degeneration, and other eye disorders. · A preventive dental visit is recommended every 6 months. · Try to get at least 150 minutes of exercise per week or 10,000 steps per day on a pedometer . · Order or download the FREE \"Exercise & Physical Activity: Your Everyday Guide\" from [employer]. Call 1-620.588.6512 or search [employer] online. · You need 5463-4064 mg of calcium and 4181-4390 IU of vitamin D per day. It is possible to meet your calcium requirement with diet alone, but a vitamin D supplement is usually necessary to meet this goal.  · When exposed to the sun, use a sunscreen that protects against both UVA and UVB radiation with an SPF of 30 or greater. Reapply every 2 to 3 hours or after sweating, drying off with a towel, or swimming. · Always wear a seat belt when traveling in a car. Always wear a helmet when riding a bicycle or motorcycle.
Normal rate, regular rhythm.

## (undated) DEVICE — FORCEPS BX L240CM JAW DIA2.8MM L CAP W/ NDL MIC MESH TOOTH

## (undated) DEVICE — CANNULA SAMP CO2 AD GRN 7FT CO2 AND 7FT O2 TBNG UNIV CONN